# Patient Record
Sex: FEMALE | Race: WHITE | NOT HISPANIC OR LATINO | Employment: OTHER | URBAN - METROPOLITAN AREA
[De-identification: names, ages, dates, MRNs, and addresses within clinical notes are randomized per-mention and may not be internally consistent; named-entity substitution may affect disease eponyms.]

---

## 2021-09-20 ENCOUNTER — TELEPHONE (OUTPATIENT)
Dept: SCHEDULING | Facility: IMAGING CENTER | Age: 81
End: 2021-09-20

## 2021-09-21 ENCOUNTER — OFFICE VISIT (OUTPATIENT)
Dept: MEDICAL GROUP | Facility: PHYSICIAN GROUP | Age: 81
End: 2021-09-21
Payer: MEDICARE

## 2021-09-21 VITALS
TEMPERATURE: 97.7 F | BODY MASS INDEX: 23.83 KG/M2 | SYSTOLIC BLOOD PRESSURE: 112 MMHG | HEART RATE: 60 BPM | OXYGEN SATURATION: 97 % | HEIGHT: 63 IN | DIASTOLIC BLOOD PRESSURE: 70 MMHG | WEIGHT: 134.5 LBS | RESPIRATION RATE: 20 BRPM

## 2021-09-21 DIAGNOSIS — N95.1 MENOPAUSAL STATE: ICD-10-CM

## 2021-09-21 DIAGNOSIS — G50.0 TIC DOULOUREUX: ICD-10-CM

## 2021-09-21 DIAGNOSIS — Z78.0 POSTMENOPAUSAL STATUS (AGE-RELATED) (NATURAL): ICD-10-CM

## 2021-09-21 DIAGNOSIS — Z12.31 ENCOUNTER FOR SCREENING MAMMOGRAM FOR BREAST CANCER: ICD-10-CM

## 2021-09-21 DIAGNOSIS — Z00.00 WELLNESS EXAMINATION: ICD-10-CM

## 2021-09-21 DIAGNOSIS — E03.9 ACQUIRED HYPOTHYROIDISM: ICD-10-CM

## 2021-09-21 PROCEDURE — 99203 OFFICE O/P NEW LOW 30 MIN: CPT | Performed by: NURSE PRACTITIONER

## 2021-09-21 RX ORDER — LEVOTHYROXINE SODIUM 0.07 MG/1
75 TABLET ORAL
Qty: 30 TABLET | Refills: 0 | Status: SHIPPED | OUTPATIENT
Start: 2021-09-21 | End: 2021-10-27

## 2021-09-21 RX ORDER — LEVOTHYROXINE SODIUM 0.07 MG/1
75 TABLET ORAL
COMMUNITY
End: 2021-09-21 | Stop reason: SDUPTHER

## 2021-09-21 RX ORDER — GABAPENTIN 100 MG/1
100 CAPSULE ORAL 3 TIMES DAILY
COMMUNITY
End: 2022-05-25 | Stop reason: SDUPTHER

## 2021-09-21 ASSESSMENT — PATIENT HEALTH QUESTIONNAIRE - PHQ9: CLINICAL INTERPRETATION OF PHQ2 SCORE: 0

## 2021-09-21 NOTE — PROGRESS NOTES
Subjective  Chief Complaint  Establish care to manage her chronic conditions    History of Present Illness  Adriana Floyd is a 81 y.o. female. This patient is here today to establish care.  Her prior PCP was in [place].    Tic douloureux  Chronic and stable. Currently taking Gabapentin 100 mg three times a day. She states that most days she will only taking one a day and maybe two. She will get pain from her temple area down to her jaw. Current treatment is working well.    Acquired hypothyroidism  Currently taking levothyroxine 75 mcg daily as prescribed.   Denies symptoms including mood changes, anxiety/depression, chest pain/pressure, palpitations, fatigue, heat/cold intolerance, polydipsia, polyuria, diarrhea/constipation, abdominal pain, unexpected weight change, skin changes, hair thickening/coarsening/falling out/thinning, brittle nails, or edema.  Due for updated lab work.    Past Medical History    Allergies: Patient has no known allergies.  History reviewed. No pertinent past medical history.  Past Surgical History:   Procedure Laterality Date   • ABDOMINAL HYSTERECTOMY TOTAL     • SINUSCOPY       Current Outpatient Medications Ordered in Epic   Medication Sig Dispense Refill   • gabapentin (NEURONTIN) 100 MG Cap Take 100 mg by mouth 3 times a day.     • levothyroxine (SYNTHROID) 75 MCG Tab Take 1 Tablet by mouth every morning on an empty stomach. 30 Tablet 0     No current Epic-ordered facility-administered medications on file.     Family History:    Family History   Problem Relation Age of Onset   • Stroke Mother    • Diabetes Brother    • Lung Disease Neg Hx    • Cancer Neg Hx    • Hypertension Neg Hx       Personal/Social History:    Social History     Tobacco Use   • Smoking status: Current Every Day Smoker     Packs/day: 0.50     Types: Cigarettes   • Smokeless tobacco: Never Used   Vaping Use   • Vaping Use: Never used   Substance Use Topics   • Alcohol use: Not Currently   • Drug use: Never  "    Social History     Social History Narrative   • Not on file      Review of Systems:     General: Negative for fever/chills and unexpected weight change.    Eyes:  Negative for vision changes, eye pain.   ENT:  Negative for hearing changes, ear pain, congestion, sore throat, and neck pain.    Respiratory:  Negative for cough and dyspnea.     Cardiovascular:  Negative for chest pain and palpitations.   Gastrointestinal:  Negative for nausea/vomiting, changes in bowel habits, and abdominal pain.    Genitourinary:  Negative for dysuria and hematuria.    Musculoskeletal:  Negative for myalgias.    Skin:  Negative for rash.    Neurological:  Negative for numbness/tingling and headaches.    Heme/Lymph:  Does not bruise/bleed easily.     Objective  Physical Exam:   /70 (BP Location: Left arm, Patient Position: Sitting, BP Cuff Size: Adult)   Pulse 60   Temp 36.5 °C (97.7 °F) (Temporal)   Resp 20   Ht 1.6 m (5' 3\")   Wt 61 kg (134 lb 8 oz)   SpO2 97%  Body mass index is 23.83 kg/m².  General:  Alert and oriented.  Well appearing.  NAD.  Head:  Normocephalic.   Eyes:  Eyes conjunctiva clear lids without ptosis, pupils equal and reactive to light accommodation.    ENT: Ears normal shape and contour, canals are clear bilaterally, tympanic membranes are benign.  Oropharynx is without erythema, edema or exudates.   Neck: Supple without JVD. No lymphadenopathy.  Pulmonary:  Normal effort.  Clear to ausculation without rales, ronchi, or wheezing.  Cardiovascular:  Regular rate and rhythm without murmur, rubs or gallop.  Radial pulses are intact and equal bilaterally.  Musculoskeletal:  No extremity cyanosis, clubbing, or edema.  Skin:  Warm and dry.  No obvious lesions.  Psych: Normal mood and affect. Alert and oriented x3. Judgment and insight is normal.      Assessment/Plan   1. Acquired hypothyroidism  Chronic and ongoing.  Continue to take Levothyroxine 75 mcg every morning.  Due for updated labs.  - TSH WITH " REFLEX TO FT4; Future  - FREE THYROXINE; Future  - TRIIDOTHYRONINE; Future  - levothyroxine (SYNTHROID) 75 MCG Tab; Take 1 Tablet by mouth every morning on an empty stomach.  Dispense: 30 Tablet; Refill: 0    2. Tic douloureux  Chronic and stable.  Continue to take Gabapentin 100 mg up to three times a day.    3. Wellness examination  Due for updated labs.  - Lipid Profile; Future  - Comp Metabolic Panel; Future    4. Encounter for screening mammogram for breast cancer  - MA-SCREENING MAMMO BILAT W/TOMOSYNTHESIS W/CAD; Future    5. Postmenopausal status (age-related) (natural)  - DS-BONE DENSITY STUDY (DEXA)    6. Menopausal state  - DS-BONE DENSITY STUDY (DEXA)      Health Maintenance: Records Requested    Return if symptoms worsen or fail to improve.    Please note that this dictation was created using voice recognition software. I have made every reasonable attempt to correct obvious errors, but I expect that there are errors of grammar and possibly content that I did not discover before finalizing the note.    JENN Fermin  [facility]

## 2021-09-21 NOTE — ASSESSMENT & PLAN NOTE
Chronic and stable. Currently taking Gabapentin 100 mg three times a day. She states that most days she will only taking one a day and maybe two. She will get pain from her temple area down to her jaw. Current treatment is working well.

## 2021-09-21 NOTE — ASSESSMENT & PLAN NOTE
Currently taking levothyroxine 75 mcg daily as prescribed.   Denies symptoms including mood changes, anxiety/depression, chest pain/pressure, palpitations, fatigue, heat/cold intolerance, polydipsia, polyuria, diarrhea/constipation, abdominal pain, unexpected weight change, skin changes, hair thickening/coarsening/falling out/thinning, brittle nails, or edema.  Due for updated lab work.

## 2021-09-21 NOTE — LETTER
[facility]  FRANCISCA RuelasPSarahRLESLIE  [address]  [place] [address]  Fax: [phone]   Authorization for Release/Disclosure of   Protected Health Information   Name: PILI GRIGGS : 1940 SSN: [id number]   Address: [address] [place] [address]  [place] [address] Phone:    [phone] (home)    I authorize the entity listed below to release/disclose the PHI below to:   [facility]/VELMA Ruelas.P.R.RICHARD and HIWOT Ruelas   Provider or Entity Name:                                              Jin Lowe    Address                     [address] [place] [address], [place] [address]   Phone:  [phone]    Fax:     Reason for request: continuity of care   Information to be released:    [  ] LAST COLONOSCOPY,  including any PATH REPORT and follow-up  [  ] LAST FIT/COLOGUARD RESULT [  ] LAST DEXA  [  ] LAST MAMMOGRAM  [  ] LAST PAP  [  ] LAST LABS [  ] RETINA EXAM REPORT  [  ] IMMUNIZATION RECORDS  [XX] Release all info      [XX] Check here and initial the line next to each item to release ALL health information INCLUDING  _____ Care and treatment for drug and / or alcohol abuse  _____ HIV testing, infection status, or AIDS  _____ Genetic Testing    DATES OF SERVICE OR TIME PERIOD TO BE DISCLOSED: _____________  I understand and acknowledge that:  * This Authorization may be revoked at any time by you in writing, except if your health information has already been used or disclosed.  * Your health information that will be used or disclosed as a result of you signing this authorization could be re-disclosed by the recipient. If this occurs, your re-disclosed health information may no longer be protected by State or Federal laws.  * You may refuse to sign this Authorization. Your refusal will not affect your ability to obtain treatment.  * This Authorization becomes effective upon signing and will  on (date) __________.      If no date is indicated, this  Authorization will  one (1) year from the signature date.    Name: Adriana Floyd    Signature:   Date:     2021       PLEASE FAX REQUESTED RECORDS BACK TO: [phone]

## 2021-10-27 DIAGNOSIS — E03.9 ACQUIRED HYPOTHYROIDISM: ICD-10-CM

## 2021-10-27 RX ORDER — LEVOTHYROXINE SODIUM 0.07 MG/1
75 TABLET ORAL
Qty: 90 TABLET | Refills: 0 | Status: SHIPPED | OUTPATIENT
Start: 2021-10-27 | End: 2022-05-25 | Stop reason: SDUPTHER

## 2021-10-27 NOTE — TELEPHONE ENCOUNTER
Requested Prescriptions     Pending Prescriptions Disp Refills   • levothyroxine (SYNTHROID) 75 MCG Tab [Pharmacy Med Name: LEVOTHYROXINE 0.075MG (75MCG) TABS] 90 Tablet 0     Sig: TAKE 1 TABLET BY MOUTH EVERY MORNING ON AN EMPTY STOMACH

## 2022-05-19 ENCOUNTER — HOSPITAL ENCOUNTER (OUTPATIENT)
Dept: LAB | Facility: MEDICAL CENTER | Age: 82
End: 2022-05-19
Attending: NURSE PRACTITIONER
Payer: MEDICARE

## 2022-05-19 DIAGNOSIS — Z00.00 WELLNESS EXAMINATION: ICD-10-CM

## 2022-05-19 DIAGNOSIS — E03.9 ACQUIRED HYPOTHYROIDISM: ICD-10-CM

## 2022-05-19 LAB
ALBUMIN SERPL BCP-MCNC: 3.8 G/DL (ref 3.2–4.9)
ALBUMIN/GLOB SERPL: 1.4 G/DL
ALP SERPL-CCNC: 100 U/L (ref 30–99)
ALT SERPL-CCNC: 10 U/L (ref 2–50)
ANION GAP SERPL CALC-SCNC: 12 MMOL/L (ref 7–16)
AST SERPL-CCNC: 16 U/L (ref 12–45)
BILIRUB SERPL-MCNC: 0.5 MG/DL (ref 0.1–1.5)
BUN SERPL-MCNC: 14 MG/DL (ref 8–22)
CALCIUM SERPL-MCNC: 10.4 MG/DL (ref 8.5–10.5)
CHLORIDE SERPL-SCNC: 111 MMOL/L (ref 96–112)
CHOLEST SERPL-MCNC: 164 MG/DL (ref 100–199)
CO2 SERPL-SCNC: 22 MMOL/L (ref 20–33)
CREAT SERPL-MCNC: 1.06 MG/DL (ref 0.5–1.4)
FASTING STATUS PATIENT QL REPORTED: NORMAL
GFR SERPLBLD CREATININE-BSD FMLA CKD-EPI: 53 ML/MIN/1.73 M 2
GLOBULIN SER CALC-MCNC: 2.8 G/DL (ref 1.9–3.5)
GLUCOSE SERPL-MCNC: 89 MG/DL (ref 65–99)
HDLC SERPL-MCNC: 42 MG/DL
LDLC SERPL CALC-MCNC: 101 MG/DL
POTASSIUM SERPL-SCNC: 4.3 MMOL/L (ref 3.6–5.5)
PROT SERPL-MCNC: 6.6 G/DL (ref 6–8.2)
SODIUM SERPL-SCNC: 145 MMOL/L (ref 135–145)
T3 SERPL-MCNC: 104 NG/DL (ref 60–181)
T4 FREE SERPL-MCNC: 1.31 NG/DL (ref 0.93–1.7)
TRIGL SERPL-MCNC: 103 MG/DL (ref 0–149)
TSH SERPL DL<=0.005 MIU/L-ACNC: 1.88 UIU/ML (ref 0.38–5.33)

## 2022-05-19 PROCEDURE — 84480 ASSAY TRIIODOTHYRONINE (T3): CPT

## 2022-05-19 PROCEDURE — 80053 COMPREHEN METABOLIC PANEL: CPT

## 2022-05-19 PROCEDURE — 36415 COLL VENOUS BLD VENIPUNCTURE: CPT

## 2022-05-19 PROCEDURE — 84443 ASSAY THYROID STIM HORMONE: CPT

## 2022-05-19 PROCEDURE — 80061 LIPID PANEL: CPT

## 2022-05-19 PROCEDURE — 84439 ASSAY OF FREE THYROXINE: CPT

## 2022-05-25 ENCOUNTER — OFFICE VISIT (OUTPATIENT)
Dept: MEDICAL GROUP | Facility: PHYSICIAN GROUP | Age: 82
End: 2022-05-25
Payer: MEDICARE

## 2022-05-25 VITALS
RESPIRATION RATE: 20 BRPM | TEMPERATURE: 98 F | OXYGEN SATURATION: 98 % | HEART RATE: 60 BPM | SYSTOLIC BLOOD PRESSURE: 110 MMHG | BODY MASS INDEX: 23.79 KG/M2 | WEIGHT: 134.25 LBS | DIASTOLIC BLOOD PRESSURE: 76 MMHG | HEIGHT: 63 IN

## 2022-05-25 DIAGNOSIS — E03.9 ACQUIRED HYPOTHYROIDISM: ICD-10-CM

## 2022-05-25 DIAGNOSIS — G50.0 TIC DOULOUREUX: ICD-10-CM

## 2022-05-25 PROCEDURE — 99214 OFFICE O/P EST MOD 30 MIN: CPT | Performed by: NURSE PRACTITIONER

## 2022-05-25 RX ORDER — GABAPENTIN 100 MG/1
100 CAPSULE ORAL 3 TIMES DAILY
Qty: 270 CAPSULE | Refills: 3 | Status: SHIPPED | OUTPATIENT
Start: 2022-05-25 | End: 2023-07-12 | Stop reason: SDUPTHER

## 2022-05-25 RX ORDER — LEVOTHYROXINE SODIUM 0.07 MG/1
75 TABLET ORAL
Qty: 90 TABLET | Refills: 3 | Status: SHIPPED | OUTPATIENT
Start: 2022-05-25 | End: 2023-07-12 | Stop reason: SDUPTHER

## 2022-05-25 ASSESSMENT — PATIENT HEALTH QUESTIONNAIRE - PHQ9: CLINICAL INTERPRETATION OF PHQ2 SCORE: 0

## 2022-05-25 NOTE — ASSESSMENT & PLAN NOTE
Currently taking levothyroxine 75 mcg daily as prescribed.   Denies symptoms including mood changes, anxiety/depression, chest pain/pressure, palpitations, fatigue, heat/cold intolerance, polydipsia, polyuria, diarrhea/constipation, abdominal pain, unexpected weight change, skin changes, hair thickening/coarsening/falling out/thinning, brittle nails, or edema.  Due for updated lab work May 2023.     05/19/22 12:01   TSH 1.880 [1]   Free T-4 1.31   T3 104.0

## 2022-05-25 NOTE — PROGRESS NOTES
Subjective  Chief Complaint  Lab Results    History of Present Illness  Adriana Floyd is a 82 y.o. female. This established patient is here today to go over recent labs.    Acquired hypothyroidism  Currently taking levothyroxine 75 mcg daily as prescribed.   Denies symptoms including mood changes, anxiety/depression, chest pain/pressure, palpitations, fatigue, heat/cold intolerance, polydipsia, polyuria, diarrhea/constipation, abdominal pain, unexpected weight change, skin changes, hair thickening/coarsening/falling out/thinning, brittle nails, or edema.  Due for updated lab work May 2023.     05/19/22 12:01   TSH 1.880 [1]   Free T-4 1.31   T3 104.0       Tic douloureux  Chronic and stable. Currently taking Gabapentin 100 mg three times a day. She states that most days she will only taking one a day and maybe two. She will get pain from her temple area down to her jaw. Current treatment is working well.    Past Medical History    Allergies: Patient has no known allergies.  History reviewed. No pertinent past medical history.  Past Surgical History:   Procedure Laterality Date   • ABDOMINAL HYSTERECTOMY TOTAL     • SINUSCOPY       Current Outpatient Medications Ordered in Epic   Medication Sig Dispense Refill   • levothyroxine (SYNTHROID) 75 MCG Tab Take 1 Tablet by mouth every morning on an empty stomach. 90 Tablet 3   • gabapentin (NEURONTIN) 100 MG Cap Take 1 Capsule by mouth 3 times a day. 270 Capsule 3     No current [facility]-ordered facility-administered medications on file.     Family History:    Family History   Problem Relation Age of Onset   • Stroke Mother    • Diabetes Brother    • Lung Disease Neg Hx    • Cancer Neg Hx    • Hypertension Neg Hx       Personal/Social History:    Social History     Tobacco Use   • Smoking status: Current Every Day Smoker     Packs/day: 0.50     Types: Cigarettes   • Smokeless tobacco: Never Used   Vaping Use   • Vaping Use: Never used   Substance Use Topics   • Alcohol  "use: Not Currently   • Drug use: Never     Social History     Social History Narrative   • Not on file      Review of Systems:   General: Negative for fever/chills and unexpected weight change.   Eyes:  Negative for vision changes, eye pain.  Respiratory:  Negative for cough and dyspnea.    Cardiovascular:  Negative for chest pain and palpitations.  Musculoskeletal:  Negative for myalgias.   Skin:  Negative for rash.   Neurological:  Negative for numbness/tingling and headaches.     Objective  Physical Exam:   /76 (BP Location: Left arm, Patient Position: Sitting, BP Cuff Size: Adult)   Pulse 60   Temp 36.7 °C (98 °F) (Temporal)   Resp 20   Ht 1.6 m (5' 3\")   Wt 60.9 kg (134 lb 4 oz)   SpO2 98%  Body mass index is 23.78 kg/m².  General:  Alert and oriented.  Well appearing.  NAD  Neck: Supple without JVD. No lymphadenopathy.  Pulmonary:  Normal effort.  Clear to ausculation without rales, ronchi, or wheezing.  Cardiovascular:  Regular rate and rhythm without murmur, rubs or gallop.   Skin:  Warm and dry.  No obvious lesions.  Musculoskeletal:  No extremity cyanosis, clubbing, or edema.      Diagnostic Testing/Findings:  Labs:    05/19/22 12:01   Sodium 145   Potassium 4.3   Chloride 111   Co2 22   Anion Gap 12.0   Glucose 89   Bun 14   Creatinine 1.06   GFR (CKD-EPI) 53 ! [1]   Calcium 10.4   AST(SGOT) 16   ALT(SGPT) 10   Alkaline Phosphatase 100 (H)   Total Bilirubin 0.5   Albumin 3.8   Total Protein 6.6   Globulin 2.8   A-G Ratio 1.4   Fasting Status Fasting   Cholesterol,Tot 164   Triglycerides 103   HDL 42    (H)   TSH 1.880 [2]   Free T-4 1.31   T3 104.0       Assessment/Plan  1. Acquired hypothyroidism  Chronic and ongoing.  Continue to take Levothyroxine 75 mcg every morning on an empty stomach.  - levothyroxine (SYNTHROID) 75 MCG Tab; Take 1 Tablet by mouth every morning on an empty stomach.  Dispense: 90 Tablet; Refill: 3    2. Tic douloureux  Chronic and ongoing.  Continue to take " Gabapentin 100 mg three times a day.  - gabapentin (NEURONTIN) 100 MG Cap; Take 1 Capsule by mouth 3 times a day.  Dispense: 270 Capsule; Refill: 3      Health Maintenance: Discussed with the patient.    Return if symptoms worsen or fail to improve.    Please note that this dictation was created using voice recognition software. I have made every reasonable attempt to correct obvious errors, but I expect that there are errors of grammar and possibly content that I did not discover before finalizing the note.    JENN Fermin  [facility]

## 2022-08-16 ENCOUNTER — OFFICE VISIT (OUTPATIENT)
Dept: URGENT CARE | Facility: PHYSICIAN GROUP | Age: 82
End: 2022-08-16
Payer: MEDICARE

## 2022-08-16 VITALS
RESPIRATION RATE: 16 BRPM | HEART RATE: 66 BPM | TEMPERATURE: 98.1 F | BODY MASS INDEX: 24.27 KG/M2 | OXYGEN SATURATION: 95 % | SYSTOLIC BLOOD PRESSURE: 118 MMHG | HEIGHT: 63 IN | DIASTOLIC BLOOD PRESSURE: 76 MMHG | WEIGHT: 137 LBS

## 2022-08-16 DIAGNOSIS — J22 LOWER RESPIRATORY INFECTION: ICD-10-CM

## 2022-08-16 PROCEDURE — 99213 OFFICE O/P EST LOW 20 MIN: CPT | Performed by: FAMILY MEDICINE

## 2022-08-16 RX ORDER — DOXYCYCLINE HYCLATE 100 MG
100 TABLET ORAL 2 TIMES DAILY
Qty: 14 TABLET | Refills: 0 | Status: SHIPPED | OUTPATIENT
Start: 2022-08-16 | End: 2022-08-23

## 2022-08-16 ASSESSMENT — ENCOUNTER SYMPTOMS
SHORTNESS OF BREATH: 1
FEVER: 0
COUGH: 1

## 2022-08-16 NOTE — PROGRESS NOTES
"Subjective:     Adriana Floyd is a 82 y.o. female who presents for Shortness of Breath    HPI  Pt presents for evaluation of an acute problem  Pt with an acute illness the past week   Having productive cough   Feels out of breath at times   Having some nasal congestion   No fevers   No diarrhea   Appetite is ok     Review of Systems   Constitutional:  Negative for fever.   Respiratory:  Positive for cough and shortness of breath.      PMH:  has no past medical history on file.  MEDS:   Current Outpatient Medications:     levothyroxine (SYNTHROID) 75 MCG Tab, Take 1 Tablet by mouth every morning on an empty stomach., Disp: 90 Tablet, Rfl: 3    gabapentin (NEURONTIN) 100 MG Cap, Take 1 Capsule by mouth 3 times a day., Disp: 270 Capsule, Rfl: 3  ALLERGIES: No Known Allergies  SURGHX:   Past Surgical History:   Procedure Laterality Date    ABDOMINAL HYSTERECTOMY TOTAL      SINUSCOPY       SOCHX:  reports that she has been smoking cigarettes. She has been smoking an average of .5 packs per day. She has never used smokeless tobacco. She reports that she does not currently use alcohol. She reports that she does not use drugs.     Objective:   /76   Pulse 66   Temp 36.7 °C (98.1 °F) (Temporal)   Resp 16   Ht 1.6 m (5' 3\")   Wt 62.1 kg (137 lb)   SpO2 95%   BMI 24.27 kg/m²     Physical Exam  Constitutional:       General: She is not in acute distress.     Appearance: She is well-developed. She is not diaphoretic.   HENT:      Head: Normocephalic and atraumatic.      Right Ear: Tympanic membrane, ear canal and external ear normal.      Left Ear: Tympanic membrane, ear canal and external ear normal.      Nose: Congestion present.      Mouth/Throat:      Mouth: Mucous membranes are moist.      Pharynx: Oropharynx is clear. No oropharyngeal exudate or posterior oropharyngeal erythema.   Neck:      Trachea: No tracheal deviation.   Cardiovascular:      Rate and Rhythm: Normal rate and regular rhythm. "   Pulmonary:      Effort: Pulmonary effort is normal. No respiratory distress.      Breath sounds: Normal breath sounds. No wheezing or rales.   Musculoskeletal:      Cervical back: Normal range of motion and neck supple. No tenderness.   Lymphadenopathy:      Cervical: No cervical adenopathy.   Skin:     General: Skin is warm and dry.      Findings: No rash.   Neurological:      Mental Status: She is alert.       Assessment/Plan:   Assessment    1. Lower respiratory infection  - doxycycline (VIBRAMYCIN) 100 MG Tab; Take 1 Tablet by mouth 2 times a day for 7 days.  Dispense: 14 Tablet; Refill: 0    Patient with lower respiratory infection.  She is making some improvements and advised to start with supportive care measures only.  Suspect she will make a great recovery.  No signs of pneumonia at this time.  However, due to her length of illness and age, did supply prescription for antibiotics to be used only on a continued basis.  Reviewed criteria for filling prescription and patient agreeable to plan.  Follow-up in the urgent care as needed.

## 2022-10-27 ENCOUNTER — TELEPHONE (OUTPATIENT)
Dept: MEDICAL GROUP | Facility: PHYSICIAN GROUP | Age: 82
End: 2022-10-27
Payer: MEDICARE

## 2022-10-27 NOTE — TELEPHONE ENCOUNTER
Called the daughter Elly back and left a voicemail as she did not answer. Let her know that the CT scan and ER documentation did not show any bleeding or concern for a brain bleed. So at this time Adriana is okay to fly back home to [place]. Left her a message to call back with any questions or concerns.

## 2022-10-27 NOTE — TELEPHONE ENCOUNTER
Caller Name: Elly Powell Daughter   Call Back Number: [phone]    How would the patient prefer to be contacted with a response: Phone call do NOT leave a detailed message    Patient had a fall causing a head injury on 10/22 and she is flying back to [place] from [place] on 10/28 and now is requesting a phone call to get advise to see if it ok or if there is any concern for Adriana to fly back.     Can you please advise.    Thank you,  Trisha Hutton  Medical assistant

## 2023-02-14 ENCOUNTER — OFFICE VISIT (OUTPATIENT)
Dept: MEDICAL GROUP | Facility: PHYSICIAN GROUP | Age: 83
End: 2023-02-14
Payer: MEDICARE

## 2023-02-14 VITALS
TEMPERATURE: 97 F | DIASTOLIC BLOOD PRESSURE: 70 MMHG | SYSTOLIC BLOOD PRESSURE: 102 MMHG | RESPIRATION RATE: 20 BRPM | WEIGHT: 131.38 LBS | HEIGHT: 63 IN | BODY MASS INDEX: 23.28 KG/M2 | HEART RATE: 66 BPM | OXYGEN SATURATION: 97 %

## 2023-02-14 DIAGNOSIS — E78.00 ELEVATED LDL CHOLESTEROL LEVEL: ICD-10-CM

## 2023-02-14 DIAGNOSIS — Z13.1 ENCOUNTER FOR SCREENING FOR DIABETES MELLITUS: ICD-10-CM

## 2023-02-14 DIAGNOSIS — E03.9 ACQUIRED HYPOTHYROIDISM: ICD-10-CM

## 2023-02-14 DIAGNOSIS — M54.2 NECK PAIN: ICD-10-CM

## 2023-02-14 PROCEDURE — 99214 OFFICE O/P EST MOD 30 MIN: CPT | Performed by: NURSE PRACTITIONER

## 2023-02-14 ASSESSMENT — PATIENT HEALTH QUESTIONNAIRE - PHQ9: CLINICAL INTERPRETATION OF PHQ2 SCORE: 0

## 2023-02-14 NOTE — PROGRESS NOTES
Subjective  Chief Complaint  Neck Pain    History of Present Illness  Adriana Floyd is a 82 y.o. female. This established patient is here today to discuss her neck pain.    Neck pain  Chronic and ongoing. She fell on the front of her right side of her head in October. She states that ever since then the back of her neck on the right side has started to hurt. She states that the pain will come and go, aching. Denies any numbness, tingling, dizziness or nausea. She has tried taking OTC Ibuprofen which has helped some. She is requesting an x-ray at this time.    Past Medical History    Allergies: Oxycodone-acetaminophen  History reviewed. No pertinent past medical history.  Past Surgical History:   Procedure Laterality Date    ABDOMINAL HYSTERECTOMY TOTAL      SINUSCOPY       Current Outpatient Medications Ordered in Epic   Medication Sig Dispense Refill    diclofenac sodium (VOLTAREN) 1 % Gel Apply 2 g topically 4 times a day as needed (Pain). 50 g 1    levothyroxine (SYNTHROID) 75 MCG Tab Take 1 Tablet by mouth every morning on an empty stomach. 90 Tablet 3    gabapentin (NEURONTIN) 100 MG Cap Take 1 Capsule by mouth 3 times a day. 270 Capsule 3     No current [facility]-ordered facility-administered medications on file.     Family History:    Family History   Problem Relation Age of Onset    Stroke Mother     Diabetes Brother     Lung Disease Neg Hx     Cancer Neg Hx     Hypertension Neg Hx       Personal/Social History:    Social History     Tobacco Use    Smoking status: Every Day     Packs/day: 0.50     Types: Cigarettes    Smokeless tobacco: Never   Vaping Use    Vaping Use: Never used   Substance Use Topics    Alcohol use: Not Currently    Drug use: Never     Social History     Social History Narrative    Not on file      Review of Systems:   General: Negative for fever/chills and unexpected weight change.   Respiratory:  Negative for cough and dyspnea.    Cardiovascular:  Negative for chest pain and  "palpitations.  Musculoskeletal: Positive for neck pain.  Skin:  Negative for rash.   Neurological:  Negative for numbness/tingling and headaches.     Objective  Physical Exam:   /70 (BP Location: Left arm, Patient Position: Sitting, BP Cuff Size: Adult)   Pulse 66   Temp 36.1 °C (97 °F) (Temporal)   Resp 20   Ht 1.6 m (5' 3\")   Wt 59.6 kg (131 lb 6 oz)   SpO2 97%  Body mass index is 23.27 kg/m².  General:  Alert and oriented.  Well appearing.  NAD  Neck: Supple without JVD. No lymphadenopathy.  Pulmonary:  Normal effort.  Clear to ausculation without rales, ronchi, or wheezing.  Cardiovascular:  Regular rate and rhythm without murmur, rubs or gallop.   Skin:  Warm and dry.  No obvious lesions.  Musculoskeletal: Posterior right lower neck tender to palpation, full ROM.      Assessment/Plan  1. Neck pain  Chronic and ongoing.  Discussed getting an x-ray of her neck, she is agreeable.  Discussed to continue to use OTC Ibuprofen for her pain.  Discussed Voltaren gel risks, benefits and side effects, she verbalized understanding.  Discussed in length a referral to Sports Medicine, she is agreeable and would like a referral placed at this time.  - DX-CERVICAL SPINE-2 OR 3 VIEWS; Future  - Referral to Sports Medicine  - diclofenac sodium (VOLTAREN) 1 % Gel; Apply 2 g topically 4 times a day as needed (Pain).  Dispense: 50 g; Refill: 1    2. Acquired hypothyroidism  Chronic and ongoing.  Continue to take Levothyroxine 75 mcg every morning on an empty stomach.  Due for updated labs.  - TSH; Future  - FREE THYROXINE; Future  - TRIIDOTHYRONINE; Future    3. Elevated LDL cholesterol level  Chronic and ongoing.  Educated on a healthy diet and exercise.  Due for updated labs.  - Lipid Profile; Future    4. Encounter for screening for diabetes mellitus  Due for updated labs.  - Comp Metabolic Panel; Future      Health Maintenance: Completed    Return if symptoms worsen or fail to improve.    Discussed that the patient " carries some responsibility in management of their health care.    Please note that this dictation was created using voice recognition software. I have made every reasonable attempt to correct obvious errors, but I expect that there are errors of grammar and possibly content that I did not discover before finalizing the note.    JENN Fermin  [facility]

## 2023-02-14 NOTE — ASSESSMENT & PLAN NOTE
Chronic and ongoing. She fell on the front of her right side of her head in October. She states that ever since then the back of her neck on the right side has started to hurt. She states that the pain will come and go, aching. Denies any numbness, tingling, dizziness or nausea. She has tried taking OTC Ibuprofen which has helped some. She is requesting an x-ray at this time.

## 2023-04-07 ENCOUNTER — TELEPHONE (OUTPATIENT)
Dept: HEALTH INFORMATION MANAGEMENT | Facility: OTHER | Age: 83
End: 2023-04-07
Payer: MEDICARE

## 2023-06-20 ENCOUNTER — HOSPITAL ENCOUNTER (OUTPATIENT)
Dept: LAB | Facility: MEDICAL CENTER | Age: 83
End: 2023-06-20
Attending: NURSE PRACTITIONER
Payer: MEDICARE

## 2023-06-20 DIAGNOSIS — Z13.1 ENCOUNTER FOR SCREENING FOR DIABETES MELLITUS: ICD-10-CM

## 2023-06-20 DIAGNOSIS — E03.9 ACQUIRED HYPOTHYROIDISM: ICD-10-CM

## 2023-06-20 DIAGNOSIS — E78.00 ELEVATED LDL CHOLESTEROL LEVEL: ICD-10-CM

## 2023-06-20 PROCEDURE — 84439 ASSAY OF FREE THYROXINE: CPT

## 2023-06-20 PROCEDURE — 80053 COMPREHEN METABOLIC PANEL: CPT

## 2023-06-20 PROCEDURE — 80061 LIPID PANEL: CPT

## 2023-06-20 PROCEDURE — 36415 COLL VENOUS BLD VENIPUNCTURE: CPT

## 2023-06-20 PROCEDURE — 84480 ASSAY TRIIODOTHYRONINE (T3): CPT

## 2023-06-20 PROCEDURE — 84443 ASSAY THYROID STIM HORMONE: CPT

## 2023-06-21 LAB
ALBUMIN SERPL BCP-MCNC: 3.9 G/DL (ref 3.2–4.9)
ALBUMIN/GLOB SERPL: 1.4 G/DL
ALP SERPL-CCNC: 82 U/L (ref 30–99)
ALT SERPL-CCNC: 9 U/L (ref 2–50)
ANION GAP SERPL CALC-SCNC: 14 MMOL/L (ref 7–16)
AST SERPL-CCNC: 10 U/L (ref 12–45)
BILIRUB SERPL-MCNC: 0.9 MG/DL (ref 0.1–1.5)
BUN SERPL-MCNC: 14 MG/DL (ref 8–22)
CALCIUM ALBUM COR SERPL-MCNC: 9.9 MG/DL (ref 8.5–10.5)
CALCIUM SERPL-MCNC: 9.8 MG/DL (ref 8.5–10.5)
CHLORIDE SERPL-SCNC: 109 MMOL/L (ref 96–112)
CHOLEST SERPL-MCNC: 190 MG/DL (ref 100–199)
CO2 SERPL-SCNC: 19 MMOL/L (ref 20–33)
CREAT SERPL-MCNC: 1.18 MG/DL (ref 0.5–1.4)
FASTING STATUS PATIENT QL REPORTED: NORMAL
GFR SERPLBLD CREATININE-BSD FMLA CKD-EPI: 46 ML/MIN/1.73 M 2
GLOBULIN SER CALC-MCNC: 2.8 G/DL (ref 1.9–3.5)
GLUCOSE SERPL-MCNC: 86 MG/DL (ref 65–99)
HDLC SERPL-MCNC: 38 MG/DL
LDLC SERPL CALC-MCNC: 126 MG/DL
POTASSIUM SERPL-SCNC: 4.1 MMOL/L (ref 3.6–5.5)
PROT SERPL-MCNC: 6.7 G/DL (ref 6–8.2)
SODIUM SERPL-SCNC: 142 MMOL/L (ref 135–145)
T3 SERPL-MCNC: 109 NG/DL (ref 60–181)
T4 FREE SERPL-MCNC: 1.82 NG/DL (ref 0.93–1.7)
TRIGL SERPL-MCNC: 132 MG/DL (ref 0–149)
TSH SERPL DL<=0.005 MIU/L-ACNC: 0.42 UIU/ML (ref 0.38–5.33)

## 2023-06-23 ENCOUNTER — TELEPHONE (OUTPATIENT)
Dept: MEDICAL GROUP | Facility: PHYSICIAN GROUP | Age: 83
End: 2023-06-23
Payer: MEDICARE

## 2023-06-24 NOTE — TELEPHONE ENCOUNTER
----- Message from August Asher III, M.D. sent at 6/21/2023  7:57 AM PDT -----  Please tell patient that Quyen is no longer at [facility].  Her lab work shows a couple of minor abnormalities.  Her GFR which is a kidney test is a little worse.  Her cholesterol and LDL are also higher than last year.  She needs to establish with a new PCP to go over these results.  Dr. Asher

## 2023-06-24 NOTE — TELEPHONE ENCOUNTER
Phone Number Called:  [phone]      Call outcome: Called the pt.  Pt was notified of results. Pt verbalized understanding. Stated no further questions. She stated she was going to go over with the cardiologist but she was going to set up an appointment with a PCP shortly

## 2023-06-25 NOTE — PROGRESS NOTES
Cardiology Initial Consultation Note    Date of note: 6/25/2023    Primary Care Provider: HIWOT Ruelas  Referring Provider: No ref. provider found    Patient Name: Adriana Floyd  YOB: 1940  MRN:              [id number]    Chief Complaint   Patient presents with    Palpitations     History of Present Illness: Ms. Adriana Floyd is a 83 y.o. female whose current medical problems include hypothyroidism, basal cell carcinoma, and x-ray of the spine that reported abdominal aorta is top normal in [place] who is here for cardiac consultation for potation's and dyspnea on exertion along with weakness.  Patient is accompanied by a family member today.    She thinks she has all the symptoms of a. Fib.  Usually in the morning, she feels like her heart is fluttering each episode lasting about a minute or so, happens about 4 times a week.  It does not happen any other times in the morning.  No associated symptoms.    She also has been having dyspnea on exertion in last 6 months, more so in last 3 months.  She also has associated lightheaded.  She feels weak like she needs to hold onto something.  And at the grocery store, she has to have a cart to hold onto as she gets very tired out and out of breath according to her and her friend.    She denies any chest pain or discomfort, no lower extreme edema, no orthopnea, no PND.    Last October, she fell off her deck and hit her head.  She has been having more pain in her next.   She has more headaches.      Cardiovascular Risk Factors:  1. Smoking status:   Tobacco Use: High Risk (6/27/2023)    Patient History     Smoking Tobacco Use: Every Day     Smokeless Tobacco Use: Never     Passive Exposure: Not on file     2. Type II Diabetes Mellitus: No results found for: HBA1C  3. Hypertension: No  4. Dyslipidemia: Not on statins  Cholesterol,Tot   Date Value Ref Range Status   06/20/2023 190 100 - 199 mg/dL Final     LDL   Date Value Ref Range Status  "  06/20/2023 126 (H) <100 mg/dL Final     HDL   Date Value Ref Range Status   06/20/2023 38 (A) >=40 mg/dL Final     Triglycerides   Date Value Ref Range Status   06/20/2023 132 0 - 149 mg/dL Final     5. Family history of early Coronary Artery Disease in a first degree relative (Male less than 55 years of age; Female less than 65 years of age): No    History reviewed. No pertinent past medical history.    Past Surgical History:   Procedure Laterality Date    ABDOMINAL HYSTERECTOMY TOTAL      SINUSCOPY         Current Outpatient Medications   Medication Sig Dispense Refill    diclofenac sodium (VOLTAREN) 1 % Gel Apply 2 g topically 4 times a day as needed (Pain). 50 g 1    levothyroxine (SYNTHROID) 75 MCG Tab Take 1 Tablet by mouth every morning on an empty stomach. 90 Tablet 3    gabapentin (NEURONTIN) 100 MG Cap Take 1 Capsule by mouth 3 times a day. 270 Capsule 3     No current facility-administered medications for this visit.       Allergies   Allergen Reactions    Oxycodone-Acetaminophen Vomiting       Family History   Problem Relation Age of Onset    Stroke Mother     Diabetes Brother     Lung Disease Neg Hx     Cancer Neg Hx     Hypertension Neg Hx      Social History     Socioeconomic History    Marital status:    Tobacco Use    Smoking status: Every Day     Packs/day: 0.50     Types: Cigarettes    Smokeless tobacco: Never   Vaping Use    Vaping Use: Never used   Substance and Sexual Activity    Alcohol use: Not Currently    Drug use: Never    Sexual activity: Not Currently      Review of Systems   Constitutional: Negative for diaphoresis and fever.   Respiratory:  Negative for cough, hemoptysis and wheezing.    Gastrointestinal:  Negative for hematochezia and melena.   Genitourinary:  Negative for hematuria.       Physical Exam:  Ambulatory Vitals  /78 (BP Location: Left arm, Patient Position: Sitting, BP Cuff Size: Adult)   Pulse (!) 57   Resp 16   Ht 1.6 m (5' 3\")   Wt 60.3 kg (133 lb)   " SpO2 95%    Oxygen Therapy:  Pulse Oximetry: 95 %  BP Readings from Last 4 Encounters:   06/27/23 122/78   02/14/23 102/70   08/16/22 118/76   05/25/22 110/76     Weight/BMI:   Body mass index is 23.56 kg/m².  Wt Readings from Last 2 Encounters:   06/27/23 60.3 kg (133 lb)   02/14/23 59.6 kg (131 lb 6 oz)     Physical Exam  Constitutional:       Appearance: Normal appearance.   Eyes:      Extraocular Movements: Extraocular movements intact.      Conjunctiva/sclera: Conjunctivae normal.   Neck:      Vascular: No carotid bruit or JVD.   Cardiovascular:      Rate and Rhythm: Normal rate and regular rhythm.      Pulses:           Radial pulses are 2+ on the right side and 2+ on the left side.        Posterior tibial pulses are 1+ on the right side and 1+ on the left side.      Heart sounds: Normal heart sounds. No murmur heard.     No friction rub. No gallop.   Pulmonary:      Effort: Pulmonary effort is normal. No respiratory distress.      Breath sounds: Normal breath sounds. No wheezing.   Abdominal:      General: Bowel sounds are normal.      Palpations: Abdomen is soft.   Musculoskeletal:      Cervical back: Neck supple.      Right lower leg: No edema.      Left lower leg: No edema.   Skin:     General: Skin is warm and dry.   Neurological:      Mental Status: She is alert and oriented to person, place, and time. Mental status is at baseline.   Psychiatric:         Mood and Affect: Mood normal.          Lab Data Review:  Lab Results   Component Value Date/Time    SODIUM 142 06/20/2023 09:52 AM    POTASSIUM 4.1 06/20/2023 09:52 AM    CHLORIDE 109 06/20/2023 09:52 AM    CO2 19 (L) 06/20/2023 09:52 AM    GLUCOSE 86 06/20/2023 09:52 AM    BUN 14 06/20/2023 09:52 AM    CREATININE 1.18 06/20/2023 09:52 AM     Lab Results   Component Value Date/Time    ALKPHOSPHAT 82 06/20/2023 09:52 AM    ASTSGOT 10 (L) 06/20/2023 09:52 AM    ALTSGPT 9 06/20/2023 09:52 AM    TBILIRUBIN 0.9 06/20/2023 09:52 AM      No results found for:  WBC  Lab Results   Component Value Date/Time    CARYL 0.420 06/20/2023 09:52 AM      Cardiac Imaging and Procedures Review:    EKG dated 6/27/23: My personal interpretation is sinus rhythm, 56 bpm, early transition, borderline nonspecific ST-T changes, low voltages in the precordial leads, no prior ECG for comparison    Assessment & Plan     1.  Palpitations lasting a minute in the morning.  Likely due to premature beats.  Does not quite sound sustained.  She gets it 4 times a week, should be able to capture something on the event monitor.  - Event monitor (Zio patch)    2.  Dyspnea on exertion and weakness.  This can be multifactorial.  Differential diagnoses would include spinal stenosis, chronotropic incompetence, highly unlikely to be obstructive coronary disease.  She does not demonstrate any signs of heart failure, and her cardiac exam is normal.  Therefore I do not think an echo is indicated.  We will go ahead and check a BNP, and if it is normal, no echo is needed, but if significantly abnormal ie >400, it would warrant a echocardiogram.  - Event monitor so we can see if her heart rate responds appropriately with exertion  - Get BNP    Shared Medical Decision Making:  All of patient's questions were answered to the best of my knowledge and to patient's satisfaction. It was a pleasure seeing Ms. Adriana Floyd in my clinic today. Return in about 5 weeks (around 8/1/2023). Patient is aware to call the cardiology clinic with any questions or concerns.    Arcelia Hooper MD  [facility]  [address]  [place] [address]  Phone:  [phone]  Fax:  [phone]    Please note that this dictation was created using voice recognition software. I have made every reasonable attempt to correct obvious errors, but it is possible there are errors of grammar and possibly content that I did not discover before finalizing the note.

## 2023-06-27 ENCOUNTER — OFFICE VISIT (OUTPATIENT)
Dept: CARDIOLOGY | Facility: MEDICAL CENTER | Age: 83
End: 2023-06-27
Attending: INTERNAL MEDICINE
Payer: MEDICARE

## 2023-06-27 VITALS
OXYGEN SATURATION: 95 % | HEIGHT: 63 IN | DIASTOLIC BLOOD PRESSURE: 78 MMHG | HEART RATE: 57 BPM | SYSTOLIC BLOOD PRESSURE: 122 MMHG | BODY MASS INDEX: 23.57 KG/M2 | RESPIRATION RATE: 16 BRPM | WEIGHT: 133 LBS

## 2023-06-27 DIAGNOSIS — R00.2 PALPITATIONS: ICD-10-CM

## 2023-06-27 DIAGNOSIS — R06.00 DYSPNEA, UNSPECIFIED TYPE: ICD-10-CM

## 2023-06-27 LAB — EKG IMPRESSION: NORMAL

## 2023-06-27 PROCEDURE — 99204 OFFICE O/P NEW MOD 45 MIN: CPT | Mod: 25 | Performed by: INTERNAL MEDICINE

## 2023-06-27 PROCEDURE — 3074F SYST BP LT 130 MM HG: CPT | Performed by: INTERNAL MEDICINE

## 2023-06-27 PROCEDURE — 93010 ELECTROCARDIOGRAM REPORT: CPT | Performed by: INTERNAL MEDICINE

## 2023-06-27 PROCEDURE — 3078F DIAST BP <80 MM HG: CPT | Performed by: INTERNAL MEDICINE

## 2023-06-27 PROCEDURE — 93005 ELECTROCARDIOGRAM TRACING: CPT | Performed by: INTERNAL MEDICINE

## 2023-06-27 PROCEDURE — 99211 OFF/OP EST MAY X REQ PHY/QHP: CPT | Performed by: INTERNAL MEDICINE

## 2023-06-27 ASSESSMENT — ENCOUNTER SYMPTOMS
WHEEZING: 0
FEVER: 0
COUGH: 0
HEMATOCHEZIA: 0
HEMOPTYSIS: 0
DIAPHORESIS: 0

## 2023-06-28 ENCOUNTER — HOSPITAL ENCOUNTER (OUTPATIENT)
Dept: LAB | Facility: MEDICAL CENTER | Age: 83
End: 2023-06-28
Attending: INTERNAL MEDICINE
Payer: MEDICARE

## 2023-06-28 DIAGNOSIS — R06.00 DYSPNEA, UNSPECIFIED TYPE: ICD-10-CM

## 2023-06-28 LAB — NT-PROBNP SERPL IA-MCNC: 664 PG/ML (ref 0–125)

## 2023-06-28 PROCEDURE — 83880 ASSAY OF NATRIURETIC PEPTIDE: CPT

## 2023-06-28 PROCEDURE — 36415 COLL VENOUS BLD VENIPUNCTURE: CPT

## 2023-06-29 DIAGNOSIS — R79.89 ELEVATED BRAIN NATRIURETIC PEPTIDE (BNP) LEVEL: ICD-10-CM

## 2023-07-11 SDOH — ECONOMIC STABILITY: TRANSPORTATION INSECURITY
IN THE PAST 12 MONTHS, HAS THE LACK OF TRANSPORTATION KEPT YOU FROM MEDICAL APPOINTMENTS OR FROM GETTING MEDICATIONS?: NO

## 2023-07-11 SDOH — ECONOMIC STABILITY: HOUSING INSECURITY
IN THE LAST 12 MONTHS, WAS THERE A TIME WHEN YOU DID NOT HAVE A STEADY PLACE TO SLEEP OR SLEPT IN A SHELTER (INCLUDING NOW)?: NO

## 2023-07-11 SDOH — HEALTH STABILITY: PHYSICAL HEALTH: ON AVERAGE, HOW MANY DAYS PER WEEK DO YOU ENGAGE IN MODERATE TO STRENUOUS EXERCISE (LIKE A BRISK WALK)?: 0 DAYS

## 2023-07-11 SDOH — ECONOMIC STABILITY: FOOD INSECURITY: WITHIN THE PAST 12 MONTHS, YOU WORRIED THAT YOUR FOOD WOULD RUN OUT BEFORE YOU GOT MONEY TO BUY MORE.: NEVER TRUE

## 2023-07-11 SDOH — ECONOMIC STABILITY: INCOME INSECURITY: HOW HARD IS IT FOR YOU TO PAY FOR THE VERY BASICS LIKE FOOD, HOUSING, MEDICAL CARE, AND HEATING?: NOT HARD AT ALL

## 2023-07-11 SDOH — ECONOMIC STABILITY: TRANSPORTATION INSECURITY
IN THE PAST 12 MONTHS, HAS LACK OF RELIABLE TRANSPORTATION KEPT YOU FROM MEDICAL APPOINTMENTS, MEETINGS, WORK OR FROM GETTING THINGS NEEDED FOR DAILY LIVING?: NO

## 2023-07-11 SDOH — ECONOMIC STABILITY: HOUSING INSECURITY: IN THE LAST 12 MONTHS, HOW MANY PLACES HAVE YOU LIVED?: 1

## 2023-07-11 SDOH — ECONOMIC STABILITY: HOUSING INSECURITY

## 2023-07-11 SDOH — ECONOMIC STABILITY: FOOD INSECURITY: WITHIN THE PAST 12 MONTHS, THE FOOD YOU BOUGHT JUST DIDN'T LAST AND YOU DIDN'T HAVE MONEY TO GET MORE.: NEVER TRUE

## 2023-07-11 SDOH — ECONOMIC STABILITY: TRANSPORTATION INSECURITY
IN THE PAST 12 MONTHS, HAS LACK OF TRANSPORTATION KEPT YOU FROM MEETINGS, WORK, OR FROM GETTING THINGS NEEDED FOR DAILY LIVING?: NO

## 2023-07-11 SDOH — HEALTH STABILITY: PHYSICAL HEALTH: ON AVERAGE, HOW MANY MINUTES DO YOU ENGAGE IN EXERCISE AT THIS LEVEL?: 0 MIN

## 2023-07-11 SDOH — HEALTH STABILITY: MENTAL HEALTH
STRESS IS WHEN SOMEONE FEELS TENSE, NERVOUS, ANXIOUS, OR CAN'T SLEEP AT NIGHT BECAUSE THEIR MIND IS TROUBLED. HOW STRESSED ARE YOU?: NOT AT ALL

## 2023-07-11 ASSESSMENT — SOCIAL DETERMINANTS OF HEALTH (SDOH)
HOW HARD IS IT FOR YOU TO PAY FOR THE VERY BASICS LIKE FOOD, HOUSING, MEDICAL CARE, AND HEATING?: NOT HARD AT ALL
HOW OFTEN DO YOU HAVE SIX OR MORE DRINKS ON ONE OCCASION: NEVER
HOW OFTEN DO YOU ATTENT MEETINGS OF THE CLUB OR ORGANIZATION YOU BELONG TO?: NEVER
HOW OFTEN DO YOU ATTEND CHURCH OR RELIGIOUS SERVICES?: NEVER
HOW OFTEN DO YOU ATTEND CHURCH OR RELIGIOUS SERVICES?: NEVER
HOW OFTEN DO YOU ATTENT MEETINGS OF THE CLUB OR ORGANIZATION YOU BELONG TO?: NEVER
WITHIN THE PAST 12 MONTHS, YOU WORRIED THAT YOUR FOOD WOULD RUN OUT BEFORE YOU GOT THE MONEY TO BUY MORE: NEVER TRUE
HOW OFTEN DO YOU HAVE A DRINK CONTAINING ALCOHOL: NEVER
HOW MANY DRINKS CONTAINING ALCOHOL DO YOU HAVE ON A TYPICAL DAY WHEN YOU ARE DRINKING: PATIENT DOES NOT DRINK
DO YOU BELONG TO ANY CLUBS OR ORGANIZATIONS SUCH AS CHURCH GROUPS UNIONS, FRATERNAL OR ATHLETIC GROUPS, OR SCHOOL GROUPS?: NO
IN A TYPICAL WEEK, HOW MANY TIMES DO YOU TALK ON THE PHONE WITH FAMILY, FRIENDS, OR NEIGHBORS?: MORE THAN THREE TIMES A WEEK
HOW OFTEN DO YOU GET TOGETHER WITH FRIENDS OR RELATIVES?: MORE THAN THREE TIMES A WEEK
IN A TYPICAL WEEK, HOW MANY TIMES DO YOU TALK ON THE PHONE WITH FAMILY, FRIENDS, OR NEIGHBORS?: MORE THAN THREE TIMES A WEEK
DO YOU BELONG TO ANY CLUBS OR ORGANIZATIONS SUCH AS CHURCH GROUPS UNIONS, FRATERNAL OR ATHLETIC GROUPS, OR SCHOOL GROUPS?: NO
HOW OFTEN DO YOU GET TOGETHER WITH FRIENDS OR RELATIVES?: MORE THAN THREE TIMES A WEEK

## 2023-07-11 ASSESSMENT — LIFESTYLE VARIABLES
HOW MANY STANDARD DRINKS CONTAINING ALCOHOL DO YOU HAVE ON A TYPICAL DAY: PATIENT DOES NOT DRINK
SKIP TO QUESTIONS 9-10: 1
AUDIT-C TOTAL SCORE: 0
HOW OFTEN DO YOU HAVE SIX OR MORE DRINKS ON ONE OCCASION: NEVER
HOW OFTEN DO YOU HAVE A DRINK CONTAINING ALCOHOL: NEVER

## 2023-07-12 ENCOUNTER — OFFICE VISIT (OUTPATIENT)
Dept: MEDICAL GROUP | Facility: PHYSICIAN GROUP | Age: 83
End: 2023-07-12
Payer: MEDICARE

## 2023-07-12 VITALS
TEMPERATURE: 98.4 F | OXYGEN SATURATION: 96 % | HEART RATE: 66 BPM | DIASTOLIC BLOOD PRESSURE: 72 MMHG | HEIGHT: 63 IN | RESPIRATION RATE: 20 BRPM | SYSTOLIC BLOOD PRESSURE: 118 MMHG | WEIGHT: 133.13 LBS | BODY MASS INDEX: 23.59 KG/M2

## 2023-07-12 DIAGNOSIS — R51.9 CHRONIC NONINTRACTABLE HEADACHE, UNSPECIFIED HEADACHE TYPE: ICD-10-CM

## 2023-07-12 DIAGNOSIS — N18.31 STAGE 3A CHRONIC KIDNEY DISEASE: ICD-10-CM

## 2023-07-12 DIAGNOSIS — E78.5 DYSLIPIDEMIA: ICD-10-CM

## 2023-07-12 DIAGNOSIS — G50.0 TIC DOULOUREUX: ICD-10-CM

## 2023-07-12 DIAGNOSIS — F17.210 CIGARETTE NICOTINE DEPENDENCE WITHOUT COMPLICATION: ICD-10-CM

## 2023-07-12 DIAGNOSIS — E03.9 ACQUIRED HYPOTHYROIDISM: ICD-10-CM

## 2023-07-12 DIAGNOSIS — G89.29 CHRONIC NONINTRACTABLE HEADACHE, UNSPECIFIED HEADACHE TYPE: ICD-10-CM

## 2023-07-12 PROBLEM — N18.30 CKD (CHRONIC KIDNEY DISEASE) STAGE 3, GFR 30-59 ML/MIN: Status: ACTIVE | Noted: 2023-07-12

## 2023-07-12 PROCEDURE — 99406 BEHAV CHNG SMOKING 3-10 MIN: CPT | Performed by: INTERNAL MEDICINE

## 2023-07-12 PROCEDURE — 99214 OFFICE O/P EST MOD 30 MIN: CPT | Mod: 25 | Performed by: INTERNAL MEDICINE

## 2023-07-12 PROCEDURE — 3074F SYST BP LT 130 MM HG: CPT | Performed by: INTERNAL MEDICINE

## 2023-07-12 PROCEDURE — 3078F DIAST BP <80 MM HG: CPT | Performed by: INTERNAL MEDICINE

## 2023-07-12 RX ORDER — GABAPENTIN 100 MG/1
100 CAPSULE ORAL 3 TIMES DAILY
Qty: 270 CAPSULE | Refills: 3 | Status: SHIPPED | OUTPATIENT
Start: 2023-07-12

## 2023-07-12 RX ORDER — LEVOTHYROXINE SODIUM 0.07 MG/1
75 TABLET ORAL
Qty: 90 TABLET | Refills: 3 | Status: SHIPPED | OUTPATIENT
Start: 2023-07-12 | End: 2023-09-14

## 2023-07-12 NOTE — PROGRESS NOTES
PRIMARY CARE CLINIC VISIT    Chief complaint:    New patient here to establish care  Follow-up CKD 3  Tic [place]  Hypothyroidism  Hyperlipidemia  Headaches  Smoking      History of Present Illness     Cigarette nicotine dependence without complication  Chronic condition.     Attestation: I spent 5 minutes of face to face counseling pt regarding nicotine cessation.   Discussed w pt and counseling on harmful effects of nicotine.     Strongly advised pt to quit.      CKD (chronic kidney disease) stage 3, GFR 30-59 ml/min ([facility])  This is a chronic condition.  Previous GFR 40 to 50s.  Advised the patient the result.  Recommend patient to avoid NSAIDs.    Tic [place]  This is a chronic ongoing condition.  The patient is taking gabapentin 100 mg up to 3 times a day.  Most of the day patient only take 1 or 2.  She will get pain from her temple area down to her jaw.  Treatment is well controlled.    Acquired hypothyroidism  Chronic condition.  The patient is taking levothyroxine 75 mcg daily.  Recent TSH result discussed with the patient.  Patient tolerating medication well    Dyslipidemia  This is a chronic condition but the patient currently on diet therapy.  Lab test result discussed with the patient.    Headache  This is a new condition.  The patient reported that while walking the patient excellently slipped and fell striking her head [place].  Patient has had recurrent headache since that time.  Headache is in the right occipital region.  Patient denies change in vision motor weakness paresthesia she is she denies slurred speech.    Current Outpatient Medications on File Prior to Visit   Medication Sig Dispense Refill    diclofenac sodium (VOLTAREN) 1 % Gel Apply 2 g topically 4 times a day as needed (Pain). 50 g 1     No current facility-administered medications on file prior to visit.        Allergies: Oxycodone-acetaminophen    Current Outpatient Medications Ordered in Epic   Medication Sig Dispense  "Refill    levothyroxine (SYNTHROID) 75 MCG Tab Take 1 Tablet by mouth every morning on an empty stomach. 90 Tablet 3    gabapentin (NEURONTIN) 100 MG Cap Take 1 Capsule by mouth 3 times a day. 270 Capsule 3    diclofenac sodium (VOLTAREN) 1 % Gel Apply 2 g topically 4 times a day as needed (Pain). 50 g 1     No current [facility]-ordered facility-administered medications on file.       History reviewed. No pertinent past medical history.    Past Surgical History:   Procedure Laterality Date    ABDOMINAL HYSTERECTOMY TOTAL      SINUSCOPY         Family History   Problem Relation Age of Onset    Stroke Mother     Diabetes Brother     Lung Disease Neg Hx     Cancer Neg Hx     Hypertension Neg Hx        Social History     Tobacco Use   Smoking Status Every Day    Packs/day: 0.50    Types: Cigarettes   Smokeless Tobacco Never       Social History     Substance and Sexual Activity   Alcohol Use Not Currently       Review of systems.  As per HPI above. All other systems reviewed and negative.      Past Medical, Social, and Family history reviewed and updated in EPIC     Objective     /72 (BP Location: Left arm, Patient Position: Sitting, BP Cuff Size: Adult)   Pulse 66   Temp 36.9 °C (98.4 °F) (Temporal)   Resp 20   Ht 1.6 m (5' 3\")   Wt 60.4 kg (133 lb 2 oz)   SpO2 96%    Body mass index is 23.58 kg/m².    General: alert in no apparent distress.  Cardiovascular: regular rate and rhythm  Pulmonary: lungs : no wheezing   Gastrointestinal: BS present. No obvious mass noted  Head normocephalic atraumatic.  Cranial nerves II through grossly intact.  Gait no ataxia noted.          Lab Results   Component Value Date/Time    SODIUM 142 06/20/2023 09:52 AM    POTASSIUM 4.1 06/20/2023 09:52 AM    GLUCOSE 86 06/20/2023 09:52 AM    BUN 14 06/20/2023 09:52 AM    CREATININE 1.18 06/20/2023 09:52 AM       Lab Results   Component Value Date/Time    CHOLSTRLTOT 190 06/20/2023 09:52 AM    TRIGLYCERIDE 132 06/20/2023 09:52 AM    HDL " 38 (A) 06/20/2023 09:52 AM     (H) 06/20/2023 09:52 AM       Lab Results   Component Value Date/Time    ALTSGPT 9 06/20/2023 09:52 AM             Assessment and Plan     1. Acquired hypothyroidism  - levothyroxine (SYNTHROID) 75 MCG Tab; Take 1 Tablet by mouth every morning on an empty stomach.  Dispense: 90 Tablet; Refill: 3  This is a chronic and stable condition.  Continue with levothyroxine 75 mcg daily.    2. Stage 3a chronic kidney disease (HCC)  Chronic stable condition.  Advised the patient to avoid NSAID.  Continue to monitor.    3. Tic [place]  - gabapentin (NEURONTIN) 100 MG Cap; Take 1 Capsule by mouth 3 times a day.  Dispense: 270 Capsule; Refill: 3  Chronic stable condition.  Continue gabapentin 100 mg 3 times a day as needed.  Potential side effect of medication discussed with the patient.    4. Dyslipidemia  This is a chronic condition.  Lab test result discussed with the patient.  Recommended low-fat low-cholesterol diet.  Recommend to repeat lipid panel next lab draw.    5. Chronic nonintractable headache, unspecified headache type  This is a new condition.  Unstable.  Patient reported recurrent headaches since October 2022  - CT-HEAD W/O; Future  reCommend follow-up after CT is done.    6. Cigarette nicotine dependence without complication  Chronic condition.  Uncontrolled.  Strongly advised the patient to quit smoking.        Attestation: I spent 5 minutes of face to face counseling pt regarding nicotine cessation.   Discussed w pt and counseling on harmful effects of nicotine.     Strongly advised pt to quit.     Attestation: I spent:   32   min -  That includes time for chart review before the visit, the actual patient visit, and time spent on documentation in EMR after the visit.  Chart review/prep, review of other providers' records, imaging/lab review, face-to-face time for history/examination, ordering, prescribing,  review of results/meds/ treatment plan with patient, and care  coordination.                 Healthcare Maintenance       Health Maintenance Due   Topic Date Due    Annual Wellness Visit  Never done    COVID-19 Vaccine (3 - Pfizer series) 04/05/2023               Please note that this dictation was created using voice recognition software. I have made every reasonable attempt to correct obvious errors, but I expect that there are errors of grammar and possibly content that I did not discover before finalizing the note.    Zain Leung MD  Internal Medicine  [facility]

## 2023-07-12 NOTE — ASSESSMENT & PLAN NOTE
This is a chronic ongoing condition.  The patient is taking gabapentin 100 mg up to 3 times a day.  Most of the day patient only take 1 or 2.  She will get pain from her temple area down to her jaw.  Treatment is well controlled.

## 2023-07-12 NOTE — ASSESSMENT & PLAN NOTE
This is a chronic condition.  Previous GFR 40 to 50s.  Advised the patient the result.  Recommend patient to avoid NSAIDs.

## 2023-07-12 NOTE — ASSESSMENT & PLAN NOTE
Chronic condition.  The patient is taking levothyroxine 75 mcg daily.  Recent TSH result discussed with the patient.  Patient tolerating medication well

## 2023-07-12 NOTE — ASSESSMENT & PLAN NOTE
This is a chronic condition but the patient currently on diet therapy.  Lab test result discussed with the patient.

## 2023-07-12 NOTE — ASSESSMENT & PLAN NOTE
This is a new condition.  The patient reported that while walking the patient excellently slipped and fell striking her head [place].  Patient has had recurrent headache since that time.  Headache is in the right occipital region.  Patient denies change in vision motor weakness paresthesia she is she denies slurred speech.

## 2023-07-14 ENCOUNTER — NON-PROVIDER VISIT (OUTPATIENT)
Dept: CARDIOLOGY | Facility: MEDICAL CENTER | Age: 83
End: 2023-07-14
Attending: INTERNAL MEDICINE
Payer: MEDICARE

## 2023-07-14 DIAGNOSIS — R00.2 PALPITATIONS: ICD-10-CM

## 2023-07-14 DIAGNOSIS — I49.3 PVCS (PREMATURE VENTRICULAR CONTRACTIONS): ICD-10-CM

## 2023-07-14 DIAGNOSIS — I47.10 SVT (SUPRAVENTRICULAR TACHYCARDIA) (HCC): ICD-10-CM

## 2023-07-14 PROCEDURE — 93246 EXT ECG>7D<15D RECORDING: CPT

## 2023-07-14 NOTE — PROGRESS NOTES
Patient enrolled in the 14 day ePatch Holter monitoring program per Arcelia Hooper MD.  >Office hook-up, serial # [id number].  >Currently pending EOS.

## 2023-07-19 ENCOUNTER — HOSPITAL ENCOUNTER (OUTPATIENT)
Dept: RADIOLOGY | Facility: MEDICAL CENTER | Age: 83
End: 2023-07-19
Attending: INTERNAL MEDICINE
Payer: MEDICARE

## 2023-07-19 DIAGNOSIS — R51.9 CHRONIC NONINTRACTABLE HEADACHE, UNSPECIFIED HEADACHE TYPE: ICD-10-CM

## 2023-07-19 DIAGNOSIS — G89.29 CHRONIC NONINTRACTABLE HEADACHE, UNSPECIFIED HEADACHE TYPE: ICD-10-CM

## 2023-07-19 PROCEDURE — 70450 CT HEAD/BRAIN W/O DYE: CPT

## 2023-08-07 ENCOUNTER — TELEPHONE (OUTPATIENT)
Dept: CARDIOLOGY | Facility: MEDICAL CENTER | Age: 83
End: 2023-08-07
Payer: MEDICARE

## 2023-08-07 PROCEDURE — 93248 EXT ECG>7D<15D REV&INTERPJ: CPT | Performed by: INTERNAL MEDICINE

## 2023-08-08 ENCOUNTER — TELEPHONE (OUTPATIENT)
Dept: CARDIOLOGY | Facility: MEDICAL CENTER | Age: 83
End: 2023-08-08
Payer: MEDICARE

## 2023-08-08 NOTE — TELEPHONE ENCOUNTER
Arcelia Hooper M.D.: Pietro Isbell and Cristina, can you let the patient know her event monitor actually looked fine.  There is no episodes of A-fib.  She did have extra beats and short atrial runs which is not life-threatening.  They were not really associate with any symptoms.  1 did last a little bit longer about 23 minutes.  We can try a low-dose metoprolol 12.5 mg p.o. twice daily if patient would like to try.  Also to cut back caffeine if she is taking any and lifestyle changes.  Thanks      Phone Number Called: [phone]    Call outcome: Spoke to patient regarding message below.    Message:     Called pt and relayed 's recommendations to pt. Pt verbalized understanding and would like to hold off on medication at this time. Pt states that she does not drink alcohol and she only drinks one cup of tea 4-5 days/week and a cup of coffee maybe once/month. Pt states that she would rather just see how she feels for a while and call us back if she decides she wants to start the medication. Pt very appreciative of call.

## 2023-08-31 ENCOUNTER — OFFICE VISIT (OUTPATIENT)
Dept: MEDICAL GROUP | Facility: PHYSICIAN GROUP | Age: 83
End: 2023-08-31
Payer: MEDICARE

## 2023-08-31 VITALS
BODY MASS INDEX: 23.28 KG/M2 | HEIGHT: 63 IN | DIASTOLIC BLOOD PRESSURE: 70 MMHG | TEMPERATURE: 97.3 F | RESPIRATION RATE: 20 BRPM | SYSTOLIC BLOOD PRESSURE: 104 MMHG | HEART RATE: 74 BPM | OXYGEN SATURATION: 97 % | WEIGHT: 131.38 LBS

## 2023-08-31 DIAGNOSIS — R09.89 BRUIT OF RIGHT CAROTID ARTERY: ICD-10-CM

## 2023-08-31 DIAGNOSIS — F17.210 CIGARETTE NICOTINE DEPENDENCE WITHOUT COMPLICATION: ICD-10-CM

## 2023-08-31 DIAGNOSIS — E78.5 DYSLIPIDEMIA: ICD-10-CM

## 2023-08-31 DIAGNOSIS — L08.9 SKIN INFECTION: ICD-10-CM

## 2023-08-31 DIAGNOSIS — E03.9 ACQUIRED HYPOTHYROIDISM: ICD-10-CM

## 2023-08-31 DIAGNOSIS — R55 NEAR SYNCOPE: ICD-10-CM

## 2023-08-31 DIAGNOSIS — N18.31 STAGE 3A CHRONIC KIDNEY DISEASE: ICD-10-CM

## 2023-08-31 PROBLEM — R51.9 HEADACHE: Status: RESOLVED | Noted: 2023-07-12 | Resolved: 2023-08-31

## 2023-08-31 PROCEDURE — 99406 BEHAV CHNG SMOKING 3-10 MIN: CPT | Performed by: INTERNAL MEDICINE

## 2023-08-31 PROCEDURE — 93000 ELECTROCARDIOGRAM COMPLETE: CPT | Performed by: INTERNAL MEDICINE

## 2023-08-31 PROCEDURE — 99214 OFFICE O/P EST MOD 30 MIN: CPT | Mod: 25 | Performed by: INTERNAL MEDICINE

## 2023-08-31 PROCEDURE — 3078F DIAST BP <80 MM HG: CPT | Performed by: INTERNAL MEDICINE

## 2023-08-31 PROCEDURE — 3074F SYST BP LT 130 MM HG: CPT | Performed by: INTERNAL MEDICINE

## 2023-08-31 RX ORDER — CEPHALEXIN 500 MG/1
500 CAPSULE ORAL 3 TIMES DAILY
Qty: 21 CAPSULE | Refills: 0 | Status: SHIPPED | OUTPATIENT
Start: 2023-08-31 | End: 2023-12-19

## 2023-08-31 NOTE — ASSESSMENT & PLAN NOTE
Chronic condition.     Attestation: I spent 5 minutes of face to face counseling pt regarding nicotine cessation.   Discussed w pt and counseling on harmful effects of nicotine.     Strongly advised pt to quit.

## 2023-08-31 NOTE — PROGRESS NOTES
PRIMARY CARE CLINIC VISIT    Chief complaint:    Near fainting spells  skin infection  CKD 3  Hypothyroidism  Hyperlipidemia        History of Present Illness     Near syncope  This is a chronic recurrent condition noted since the last several months.  The patient reported near syncopal episode occurring approximately once or twice a month.  Patient denies history of head trauma or injury.  Patient denies motor weakness paresthesia or slurred speech.  These episodes may occur at random time without any warning.  Patient denies complete loss of consciousness.  She denies chest pain shortness of breath  Patient reported episode of palpitation.  The patient has been seen by cardiologist recently and is scheduled to undergo an echocardiogram the patient has not set up the appointment for that.    She had a event recorder done recently.  No significant findings reported.  Result discussed with the patient.  Findings:   1.  Predominant rhythm is sinus, range 45 to 108 bpm.   2.  Rare premature atrial/supraventricular beats (1.29%).  Rare couplets.  Rare premature ventricular beats, 0.02%.   3.  1824 supraventricular/atrial runs, longest lasted 23 minutes on day 13 at 2:14 PM, unfortunately rhythm strips was not displayed.   4.  There were 44 patient triggered events.  Symptoms reported were palpitations, heart fluttering, skipping beats, lightheadedness and dizziness most corresponding to sinus rhythm.     Impression  Performed by: ECG EPIPHANY  1.  Predominant rhythm is sinus.   2.  Occasional supraventricular/atrial runs, longest is last 23 minutes not associated with symptoms.   3.  Reported symptoms corresponding to sinus rhythm.                 Last episode of near syncope was approximately 2 weeks ago.  Currently the patient asymptomatic.    Patient did have head CT scan done recently which was unremarkable.  Result discussed with the patient    Skin infection  New condition.  Patient reported skin lesion in the  anterior abdominal wall region noted since last couple weeks  Patient has been using Neosporin ointment and hydrogen peroxide with some improvement.  Patient denies fever or chills.    CKD (chronic kidney disease) stage 3, GFR 30-59 ml/min ([facility])  Chronic condition.  Previous GFR 40s to 50s.  Advised the patient to avoid NSAID.  Lab test ordered for follow-up    Acquired hypothyroidism  Chronic condition.  Patient is taking levothyroxine 75 mcg daily.  Patient tolerated medication well.    Dyslipidemia  Chronic condition.  Patient is currently on diet therapy.  Blood test requested for follow-up.    Cigarette nicotine dependence without complication  Chronic condition.     Attestation: I spent 5 minutes of face to face counseling pt regarding nicotine cessation.   Discussed w pt and counseling on harmful effects of nicotine.     Strongly advised pt to quit.        Current Outpatient Medications on File Prior to Visit   Medication Sig Dispense Refill    levothyroxine (SYNTHROID) 75 MCG Tab Take 1 Tablet by mouth every morning on an empty stomach. 90 Tablet 3    gabapentin (NEURONTIN) 100 MG Cap Take 1 Capsule by mouth 3 times a day. 270 Capsule 3    diclofenac sodium (VOLTAREN) 1 % Gel Apply 2 g topically 4 times a day as needed (Pain). 50 g 1     No current facility-administered medications on file prior to visit.        Allergies: Oxycodone-acetaminophen    Current Outpatient Medications Ordered in Epic   Medication Sig Dispense Refill    cephALEXin (KEFLEX) 500 MG Cap Take 1 Capsule by mouth 3 times a day. 21 Capsule 0    levothyroxine (SYNTHROID) 75 MCG Tab Take 1 Tablet by mouth every morning on an empty stomach. 90 Tablet 3    gabapentin (NEURONTIN) 100 MG Cap Take 1 Capsule by mouth 3 times a day. 270 Capsule 3    diclofenac sodium (VOLTAREN) 1 % Gel Apply 2 g topically 4 times a day as needed (Pain). 50 g 1     No current [facility]-ordered facility-administered medications on file.       History reviewed. No  "pertinent past medical history.    Past Surgical History:   Procedure Laterality Date    ABDOMINAL HYSTERECTOMY TOTAL      SINUSCOPY         Family History   Problem Relation Age of Onset    Stroke Mother     Diabetes Brother     Lung Disease Neg Hx     Cancer Neg Hx     Hypertension Neg Hx        Social History     Tobacco Use   Smoking Status Every Day    Current packs/day: 0.50    Types: Cigarettes   Smokeless Tobacco Never       Social History     Substance and Sexual Activity   Alcohol Use Not Currently       Review of systems.  As per HPI above. All other systems reviewed and negative.      Past Medical, Social, and Family history reviewed and updated in EPIC     Objective     /70   Pulse 74   Temp 36.3 °C (97.3 °F) (Temporal)   Resp 20   Ht 1.6 m (5' 3\")   Wt 59.6 kg (131 lb 6 oz)   SpO2 97%    Body mass index is 23.27 kg/m².    General: alert in no apparent distress.  Cardiovascular: regular rate and rhythm  Pulmonary: lungs : no wheezing   Gastrointestinal: BS present. No obvious mass noted  Right-sided abdominal showed there is a superficial ulceration lesion measuring approximately 1 cm in diameter no discharge noted there is surrounding erythema.  No fluctuance.        Lab Results   Component Value Date/Time    SODIUM 142 06/20/2023 09:52 AM    POTASSIUM 4.1 06/20/2023 09:52 AM    GLUCOSE 86 06/20/2023 09:52 AM    BUN 14 06/20/2023 09:52 AM    CREATININE 1.18 06/20/2023 09:52 AM       Lab Results   Component Value Date/Time    CHOLSTRLTOT 190 06/20/2023 09:52 AM    TRIGLYCERIDE 132 06/20/2023 09:52 AM    HDL 38 (A) 06/20/2023 09:52 AM     (H) 06/20/2023 09:52 AM       Lab Results   Component Value Date/Time    ALTSGPT 9 06/20/2023 09:52 AM             Assessment and Plan     1. Near syncope  Chronic recurrent condition.  Uncontrolled.  This episode has been occurring a regular basis since the last several months.  Exact cause unclear.  Further investigation is warranted.  Exam today " show patient with carotid bruit    - EKG  I independently interpreted the patient's ekg: Sinus rhythm.  Rate is 58 bpm abnormal R wave progression.  Nonspecific T wave abnormality.  No acute injury pattern noted.  Test ordered today including CBC chemistry.  Carotid ultrasound also requested.  Refer the patient to neurology for further evaluation.  Advised the patient to follow-up with cardiologist.  Stressed important to schedule the appointment of echocardiogram also.  - US-CAROTID DOPPLER BILAT; Future  - Referral to Neurology  Follow-up after the above said done  Patient to go ER if symptoms recur.    2. Stage 3a chronic kidney disease (HCC)  - Basic Metabolic Panel; Future  Chronic condition.  Current status unclear.  Lab test requested for follow-up.  Advised the patient to avoid NSAID.    3. Acquired hypothyroidism  - TSH; Future  Chronic condition.  Current status unclear.  Lab test ordered.  Continue levothyroxine 75 mcg daily for    4. Dyslipidemia  - Lipid Profile; Future  Chronic condition.  Current status unclear.  Lipid panel requested.  Recommend low-fat low-cholesterol diet.    5. Skin infection  This is a new condition.  Unstable.  Advised the patient to start Keflex 500 mg p.o. 3 times daily.  Recommend office follow-up in 7 days for wound recheck.  The patient to continue with dressing change twice daily and to use topical antibiotic such as Neosporin.      6. Cigarette nicotine dependence without complication  Chronic condition.  Uncontrolled.  Advised the patient to quit smoking completely.      7. Bruit of right carotid artery  - US-CAROTID DOPPLER BILAT; Future    Other orders  - cephALEXin (KEFLEX) 500 MG Cap; Take 1 Capsule by mouth 3 times a day.  Dispense: 21 Capsule; Refill: 0            Attestation: I spent 5 minutes of face to face counseling pt regarding nicotine cessation.   Discussed w pt and counseling on harmful effects of nicotine.     Strongly advised pt to quit.     Attestation:  I spent:   33  min -  That includes time for chart review before the visit, the actual patient visit, and time spent on documentation in EMR after the visit.  Chart review/prep, review of other providers' records, imaging/lab review, face-to-face time for history/examination, pt's counseling/education, ordering, prescribing,  review of results/meds/ treatment plan with patient, and care coordination.           Healthcare Maintenance       Health Maintenance Due   Topic Date Due    Annual Wellness Visit  Never done    Zoster (Shingles) Vaccines (1 of 2) Never done    COVID-19 Vaccine (3 - Pfizer series) 04/05/2023    Influenza Vaccine (1) 09/01/2023               Please note that this dictation was created using voice recognition software. I have made every reasonable attempt to correct obvious errors, but I expect that there are errors of grammar and possibly content that I did not discover before finalizing the note.    Zain Leung MD  Internal Medicine  [facility]

## 2023-08-31 NOTE — ASSESSMENT & PLAN NOTE
Chronic condition.  Previous GFR 40s to 50s.  Advised the patient to avoid NSAID.  Lab test ordered for follow-up

## 2023-08-31 NOTE — ASSESSMENT & PLAN NOTE
This is a chronic recurrent condition noted since the last several months.  The patient reported near syncopal episode occurring approximately once or twice a month.  Patient denies history of head trauma or injury.  Patient denies motor weakness paresthesia or slurred speech.  These episodes may occur at random time without any warning.  Patient denies complete loss of consciousness.  She denies chest pain shortness of breath  Patient reported episode of palpitation.  The patient has been seen by cardiologist recently and is scheduled to undergo an echocardiogram the patient has not set up the appointment for that.    She had a event recorder done recently.  No significant findings reported.  Result discussed with the patient.  Findings:   1.  Predominant rhythm is sinus, range 45 to 108 bpm.   2.  Rare premature atrial/supraventricular beats (1.29%).  Rare couplets.  Rare premature ventricular beats, 0.02%.   3.  1824 supraventricular/atrial runs, longest lasted 23 minutes on day 13 at 2:14 PM, unfortunately rhythm strips was not displayed.   4.  There were 44 patient triggered events.  Symptoms reported were palpitations, heart fluttering, skipping beats, lightheadedness and dizziness most corresponding to sinus rhythm.     Impression  Performed by: ECG EPIPHANY  1.  Predominant rhythm is sinus.   2.  Occasional supraventricular/atrial runs, longest is last 23 minutes not associated with symptoms.   3.  Reported symptoms corresponding to sinus rhythm.                 Last episode of near syncope was approximately 2 weeks ago.  Currently the patient asymptomatic.    Patient did have head CT scan done recently which was unremarkable.  Result discussed with the patient

## 2023-08-31 NOTE — ASSESSMENT & PLAN NOTE
New condition.  Patient reported skin lesion in the anterior abdominal wall region noted since last couple weeks  Patient has been using Neosporin ointment and hydrogen peroxide with some improvement.  Patient denies fever or chills.

## 2023-09-08 ENCOUNTER — TELEPHONE (OUTPATIENT)
Dept: HEALTH INFORMATION MANAGEMENT | Facility: OTHER | Age: 83
End: 2023-09-08
Payer: MEDICARE

## 2023-09-13 ENCOUNTER — HOSPITAL ENCOUNTER (OUTPATIENT)
Dept: LAB | Facility: MEDICAL CENTER | Age: 83
End: 2023-09-13
Attending: INTERNAL MEDICINE
Payer: MEDICARE

## 2023-09-13 DIAGNOSIS — R55 NEAR SYNCOPE: ICD-10-CM

## 2023-09-13 DIAGNOSIS — N18.31 STAGE 3A CHRONIC KIDNEY DISEASE: ICD-10-CM

## 2023-09-13 DIAGNOSIS — E03.9 ACQUIRED HYPOTHYROIDISM: ICD-10-CM

## 2023-09-13 DIAGNOSIS — E78.5 DYSLIPIDEMIA: ICD-10-CM

## 2023-09-13 PROCEDURE — 87086 URINE CULTURE/COLONY COUNT: CPT | Mod: GA

## 2023-09-13 PROCEDURE — 84443 ASSAY THYROID STIM HORMONE: CPT

## 2023-09-13 PROCEDURE — 80048 BASIC METABOLIC PNL TOTAL CA: CPT

## 2023-09-13 PROCEDURE — 85027 COMPLETE CBC AUTOMATED: CPT

## 2023-09-13 PROCEDURE — 36415 COLL VENOUS BLD VENIPUNCTURE: CPT

## 2023-09-13 PROCEDURE — 80061 LIPID PANEL: CPT

## 2023-09-14 ENCOUNTER — TELEPHONE (OUTPATIENT)
Dept: HEALTH INFORMATION MANAGEMENT | Facility: OTHER | Age: 83
End: 2023-09-14
Payer: MEDICARE

## 2023-09-14 DIAGNOSIS — N18.31 STAGE 3A CHRONIC KIDNEY DISEASE: Chronic | ICD-10-CM

## 2023-09-14 DIAGNOSIS — E03.9 ACQUIRED HYPOTHYROIDISM: ICD-10-CM

## 2023-09-14 PROBLEM — N18.30 CKD (CHRONIC KIDNEY DISEASE) STAGE 3, GFR 30-59 ML/MIN: Chronic | Status: ACTIVE | Noted: 2023-07-12

## 2023-09-14 LAB
ANION GAP SERPL CALC-SCNC: 8 MMOL/L (ref 7–16)
BUN SERPL-MCNC: 21 MG/DL (ref 8–22)
CALCIUM SERPL-MCNC: 9.6 MG/DL (ref 8.5–10.5)
CHLORIDE SERPL-SCNC: 108 MMOL/L (ref 96–112)
CHOLEST SERPL-MCNC: 184 MG/DL (ref 100–199)
CO2 SERPL-SCNC: 23 MMOL/L (ref 20–33)
CREAT SERPL-MCNC: 1.4 MG/DL (ref 0.5–1.4)
ERYTHROCYTE [DISTWIDTH] IN BLOOD BY AUTOMATED COUNT: 49.1 FL (ref 35.9–50)
FASTING STATUS PATIENT QL REPORTED: NORMAL
GFR SERPLBLD CREATININE-BSD FMLA CKD-EPI: 37 ML/MIN/1.73 M 2
GLUCOSE SERPL-MCNC: 92 MG/DL (ref 65–99)
HCT VFR BLD AUTO: 46 % (ref 37–47)
HDLC SERPL-MCNC: 42 MG/DL
HGB BLD-MCNC: 14.5 G/DL (ref 12–16)
LDLC SERPL CALC-MCNC: 118 MG/DL
MCH RBC QN AUTO: 30.2 PG (ref 27–33)
MCHC RBC AUTO-ENTMCNC: 31.5 G/DL (ref 32.2–35.5)
MCV RBC AUTO: 95.8 FL (ref 81.4–97.8)
PLATELET # BLD AUTO: 204 K/UL (ref 164–446)
PMV BLD AUTO: 10.1 FL (ref 9–12.9)
POTASSIUM SERPL-SCNC: 4.5 MMOL/L (ref 3.6–5.5)
RBC # BLD AUTO: 4.8 M/UL (ref 4.2–5.4)
SODIUM SERPL-SCNC: 139 MMOL/L (ref 135–145)
TRIGL SERPL-MCNC: 118 MG/DL (ref 0–149)
TSH SERPL DL<=0.005 MIU/L-ACNC: 0.13 UIU/ML (ref 0.38–5.33)
WBC # BLD AUTO: 7.4 K/UL (ref 4.8–10.8)

## 2023-09-14 RX ORDER — LEVOTHYROXINE SODIUM 0.05 MG/1
50 TABLET ORAL
Qty: 90 TABLET | Refills: 3 | Status: SHIPPED | OUTPATIENT
Start: 2023-09-14

## 2023-09-15 LAB
BACTERIA UR CULT: NORMAL
SIGNIFICANT IND 70042: NORMAL
SITE SITE: NORMAL
SOURCE SOURCE: NORMAL

## 2023-09-26 ENCOUNTER — HOSPITAL ENCOUNTER (OUTPATIENT)
Dept: RADIOLOGY | Facility: MEDICAL CENTER | Age: 83
End: 2023-09-26
Attending: INTERNAL MEDICINE
Payer: MEDICARE

## 2023-09-26 ENCOUNTER — HOSPITAL ENCOUNTER (OUTPATIENT)
Dept: RADIOLOGY | Facility: MEDICAL CENTER | Age: 83
End: 2023-09-26
Attending: NURSE PRACTITIONER
Payer: MEDICARE

## 2023-09-26 DIAGNOSIS — R09.89 BRUIT OF RIGHT CAROTID ARTERY: ICD-10-CM

## 2023-09-26 DIAGNOSIS — M54.2 NECK PAIN: ICD-10-CM

## 2023-09-26 DIAGNOSIS — R55 NEAR SYNCOPE: ICD-10-CM

## 2023-09-26 PROCEDURE — 72040 X-RAY EXAM NECK SPINE 2-3 VW: CPT

## 2023-09-26 PROCEDURE — 93880 EXTRACRANIAL BILAT STUDY: CPT

## 2023-09-27 ENCOUNTER — OFFICE VISIT (OUTPATIENT)
Dept: NEUROLOGY | Facility: MEDICAL CENTER | Age: 83
End: 2023-09-27
Attending: INTERNAL MEDICINE
Payer: MEDICARE

## 2023-09-27 VITALS
HEART RATE: 67 BPM | RESPIRATION RATE: 18 BRPM | WEIGHT: 131.84 LBS | SYSTOLIC BLOOD PRESSURE: 100 MMHG | HEIGHT: 63 IN | OXYGEN SATURATION: 94 % | DIASTOLIC BLOOD PRESSURE: 62 MMHG | BODY MASS INDEX: 23.36 KG/M2 | TEMPERATURE: 97.6 F

## 2023-09-27 DIAGNOSIS — R55 NEAR SYNCOPE: ICD-10-CM

## 2023-09-27 DIAGNOSIS — I65.23 BILATERAL CAROTID ARTERY STENOSIS: ICD-10-CM

## 2023-09-27 DIAGNOSIS — M54.2 CERVICALGIA: ICD-10-CM

## 2023-09-27 PROCEDURE — 99205 OFFICE O/P NEW HI 60 MIN: CPT | Performed by: INTERNAL MEDICINE

## 2023-09-27 PROCEDURE — 99211 OFF/OP EST MAY X REQ PHY/QHP: CPT | Performed by: INTERNAL MEDICINE

## 2023-09-27 PROCEDURE — 3078F DIAST BP <80 MM HG: CPT | Performed by: INTERNAL MEDICINE

## 2023-09-27 PROCEDURE — 3074F SYST BP LT 130 MM HG: CPT | Performed by: INTERNAL MEDICINE

## 2023-09-27 ASSESSMENT — FIBROSIS 4 INDEX: FIB4 SCORE: 1.34

## 2023-09-27 NOTE — PROGRESS NOTES
"[facility]  [address] [place] [address], [address]. [place] [address]  Phone: [phone]    Name: Adriana Floyd  : 1940  MRN: [id number]    ASSESSMENT / PLAN       Adriana Floyd is a 83 y.o. RHD female presenting for near syncope spells    Orthostatic hypotension  Suspect a cardiovascular etiology as she does not have any seizure-like episodes or confusion after these events.  They are usually triggered with standing, and relieved with sitting or laying down. no signs of neuropathy or history of diabetes that would suggest a neurogenic orthostatic hypotension.  - check blood pressure if feeling lightheaded  - increase fluid intake and add salt to diet  - careful after eating big meals and after using bathroom  - take time when standing in case of feeling lightheaded  - follow up with PCP about echocardiogram      Return if symptoms worsen or fail to improve.      Tae Grady DO  Neurology, Movement Disorders Specialist    BILLING DOCUMENTATION: I spent 60 minutes reviewing the medical record, interviewing and examining the patient, discussing my impression (see \"assessment\" above), and coordinating care.        HISTORY OF PRESENT ILLNESS      Adriana Floyd is a 83 y.o. RHD female presenting for near syncope spells.    She was having lightheaded episodes starting a few months ago.  Sometimes with standing, she can feel like she is about to faint.  It can happen throughout the day for a single day, but can resolve for a few days before happening again.  She does not have any sensation or symptoms prior to these events aside from standing.  Denies any loss of consciousness, twitching, or confusion after the episodes.    She has 1 caffeinated drink in the morning, otherwise has not paid close attention to how much fluid she is drinking.     Fell in : tripped and hit forehead. Pain in right back of head since then.    Workup  Pending echocardiogram    Heart monitor 2023, showing " supraventricular atrial runs, with the most lasting 23 minutes.  Triggered symptoms were corresponding to sinus rhythm    9/26/2023 US Carotid Doppler showed mild atherosclerotic plaque in carotid bulbs and proximal ICA.  No significant stenosis, less than 50%.  Vertebral arteries show anterograde flow.    CT head 7/2023: Reviewed, generalized atrophy as expected for age    No past medical history on file.    Past Surgical History:   Procedure Laterality Date    ABDOMINAL HYSTERECTOMY TOTAL      SINUSCOPY         Family History   Problem Relation Age of Onset    Stroke Mother     Diabetes Brother     Lung Disease Neg Hx     Cancer Neg Hx     Hypertension Neg Hx        Social History     Socioeconomic History    Marital status:      Spouse name: Not on file    Number of children: Not on file    Years of education: Not on file    Highest education level: 12th grade   Occupational History    Not on file   Tobacco Use    Smoking status: Every Day     Current packs/day: 0.50     Types: Cigarettes    Smokeless tobacco: Never   Vaping Use    Vaping Use: Never used   Substance and Sexual Activity    Alcohol use: Not Currently    Drug use: Never    Sexual activity: Not Currently   Other Topics Concern    Not on file   Social History Narrative    Not on file     Social Determinants of Health     Financial Resource Strain: Low Risk  (7/11/2023)    Overall Financial Resource Strain (CARDIA)     Difficulty of Paying Living Expenses: Not hard at all   Food Insecurity: No Food Insecurity (7/11/2023)    Hunger Vital Sign     Worried About Running Out of Food in the Last Year: Never true     Ran Out of Food in the Last Year: Never true   Transportation Needs: No Transportation Needs (7/11/2023)    PRAPARE - Transportation     Lack of Transportation (Medical): No     Lack of Transportation (Non-Medical): No   Physical Activity: Inactive (7/11/2023)    Exercise Vital Sign     Days of Exercise per Week: 0 days     Minutes of  "Exercise per Session: 0 min   Stress: No Stress Concern Present (7/11/2023)    [employer] [place] [employer] - Occupational Stress Questionnaire     Feeling of Stress : Not at all   Social Connections: Socially Isolated (7/11/2023)    Social Connection and Isolation Panel [NHANES]     Frequency of Communication with Friends and Family: More than three times a week     Frequency of Social Gatherings with Friends and Family: More than three times a week     Attends Hoahaoism Services: Never     Active Member of Clubs or Organizations: No     Attends Club or Organization Meetings: Never     Marital Status:    Intimate Partner Violence: Not on file   Housing Stability: Unknown (7/11/2023)    Housing Stability Vital Sign     Unable to Pay for Housing in the Last Year: Not on file     Number of Places Lived in the Last Year: 1     Unstable Housing in the Last Year: No       Current Outpatient Medications   Medication    levothyroxine (SYNTHROID) 50 MCG Tab    cephALEXin (KEFLEX) 500 MG Cap    gabapentin (NEURONTIN) 100 MG Cap     No current facility-administered medications for this visit.       Allergies   Allergen Reactions    Oxycodone-Acetaminophen Vomiting         OBJECTIVE      Vitals:    09/27/23 1410 09/27/23 1412 09/27/23 1418   BP: 100/62 122/80 100/62   BP Location: Left arm Left arm Left arm   Patient Position: Sitting Supine Standing   BP Cuff Size: Adult Adult Adult   Pulse: 78 61 67   Resp: 18     Temp: 36.4 °C (97.6 °F)     TempSrc: Temporal     SpO2: 97% 96% 94%   Weight: 59.8 kg (131 lb 13.4 oz)     Height: 1.6 m (5' 3\")         Physical Exam     General: NAD, appears stated age.      Mental status: speech clear and fluent. Fund of knowledge is good.      Cranial Nerves:  CN2: PERRL.   CN3/4/6: EOMI. There is no nystagmus. Saccades are normal.   CN5: V1-V3 intact to light touch    CN7: Symmetric face.   CN8: Hearing grossly intact.   CN9/10/12: Soft palate and uvula rise symmetrically. " Tongue midline.   CN11: Shoulder shrug intact bilaterally.     Strength  Right Left   Shoulder Abduction  5 5   Elbow Flexion 5 5   Elbow Extension  5 5   Wrist Extension  5 5   Finger Extension  5 5   Hip Flexion  5 5   Knee Extension 5 5   Knee Flexion  5 5   Ankle Dorsiflexion  5 5     Deep Tendon Reflexes: 1+ and symmetric in biceps, brachioradialis, patella. 0 in ankles. Plantar reflexes in flexion.      Sensory: normal to light touch, temperature, and vibration in all extremities.       Cerebellar: No dysmetria with FTN or heel-to-shin.    Gait:   Posture - normal   Base - narrow   Stride length - normal   Arm swing - normal   Speed - normal  Shuffling/freezing - none  U-Turn - normal   Heel, toe, and tandem - normal       DATA / RESULTS          PROCEDURE     N/A

## 2023-09-27 NOTE — PATIENT INSTRUCTIONS
- check blood pressure if feeling lightheaded  - increase fluid intake and add salt to diet  - careful after eating big meals and after using bathroom  - take time when standing in case of feeling lightheaded  - follow up with PCP about echocardiogram

## 2023-10-24 ENCOUNTER — APPOINTMENT (OUTPATIENT)
Dept: CARDIOLOGY | Facility: MEDICAL CENTER | Age: 83
End: 2023-10-24
Attending: INTERNAL MEDICINE
Payer: MEDICARE

## 2023-11-14 ENCOUNTER — HOSPITAL ENCOUNTER (OUTPATIENT)
Dept: CARDIOLOGY | Facility: MEDICAL CENTER | Age: 83
End: 2023-11-14
Attending: INTERNAL MEDICINE
Payer: MEDICARE

## 2023-11-14 DIAGNOSIS — R79.89 ELEVATED BRAIN NATRIURETIC PEPTIDE (BNP) LEVEL: ICD-10-CM

## 2023-11-14 LAB
LV EJECT FRACT  99904: 65
LV EJECT FRACT MOD 2C 99903: 67.34
LV EJECT FRACT MOD 4C 99902: 67.13
LV EJECT FRACT MOD BP 99901: 67.29

## 2023-11-14 PROCEDURE — 93306 TTE W/DOPPLER COMPLETE: CPT

## 2023-11-14 PROCEDURE — 93306 TTE W/DOPPLER COMPLETE: CPT | Mod: 26 | Performed by: INTERNAL MEDICINE

## 2023-11-15 ENCOUNTER — OFFICE VISIT (OUTPATIENT)
Dept: MEDICAL GROUP | Facility: PHYSICIAN GROUP | Age: 83
End: 2023-11-15
Payer: MEDICARE

## 2023-11-15 VITALS
BODY MASS INDEX: 23.04 KG/M2 | RESPIRATION RATE: 16 BRPM | OXYGEN SATURATION: 96 % | DIASTOLIC BLOOD PRESSURE: 58 MMHG | WEIGHT: 130 LBS | HEART RATE: 55 BPM | TEMPERATURE: 97.7 F | SYSTOLIC BLOOD PRESSURE: 112 MMHG | HEIGHT: 63 IN

## 2023-11-15 DIAGNOSIS — G89.29 CHRONIC INTRACTABLE HEADACHE, UNSPECIFIED HEADACHE TYPE: ICD-10-CM

## 2023-11-15 DIAGNOSIS — R51.9 CHRONIC INTRACTABLE HEADACHE, UNSPECIFIED HEADACHE TYPE: ICD-10-CM

## 2023-11-15 DIAGNOSIS — E78.5 DYSLIPIDEMIA: ICD-10-CM

## 2023-11-15 DIAGNOSIS — F17.210 CIGARETTE NICOTINE DEPENDENCE WITHOUT COMPLICATION: ICD-10-CM

## 2023-11-15 DIAGNOSIS — Z02.9 ADMINISTRATIVE ENCOUNTER: ICD-10-CM

## 2023-11-15 DIAGNOSIS — N18.32 STAGE 3B CHRONIC KIDNEY DISEASE: Chronic | ICD-10-CM

## 2023-11-15 DIAGNOSIS — E03.9 ACQUIRED HYPOTHYROIDISM: ICD-10-CM

## 2023-11-15 PROCEDURE — 3074F SYST BP LT 130 MM HG: CPT | Performed by: INTERNAL MEDICINE

## 2023-11-15 PROCEDURE — 99406 BEHAV CHNG SMOKING 3-10 MIN: CPT | Performed by: INTERNAL MEDICINE

## 2023-11-15 PROCEDURE — 3078F DIAST BP <80 MM HG: CPT | Performed by: INTERNAL MEDICINE

## 2023-11-15 PROCEDURE — 99214 OFFICE O/P EST MOD 30 MIN: CPT | Mod: 25 | Performed by: INTERNAL MEDICINE

## 2023-11-15 ASSESSMENT — FIBROSIS 4 INDEX: FIB4 SCORE: 1.34

## 2023-11-15 NOTE — ASSESSMENT & PLAN NOTE
Chronic condition.  Patient currently on diet therapy.  Recent lab test result discussed with the patient.

## 2023-11-15 NOTE — PROGRESS NOTES
PRIMARY CARE CLINIC VISIT        Chief Complaint   Patient presents with     Recurrent headache  Request DMV form to be completed  Follow-up chronic kidney disease  Hypothyroidism  Hyperlipidemia          History of Present Illness     CKD (chronic kidney disease) stage 3, GFR 30-59 ml/min ([facility], located within [facility])  Chronic condition.  Previous GFR in the 40s-50s.  Most recent labs showed GFR 37    Test result discussed with the patient.  I have recommended renal ultrasound to be done and refer the patient to nephrology for consultation.  Patient currently asymptomatic.      Hypothyroidism  Chronic condition.  The patient is taking levothyroxine 50 mcg daily.  Tolerating medication well.    Dyslipidemia  Chronic condition.  Patient currently on diet therapy.  Recent lab test result discussed with the patient.    Headache  This is a chronic condition.  The patient reported recurrent headaches since the last 1 year.  She denies recent trauma or injury.  Headache is noted in the right parietal region with intermittent radiation onto the neck area.  Patient denies motor weakness or paresthesia.  She denies change in vision.  She denies slurred speech patient denies loss of consciousness.  Patient has been taking Tylenol as needed for pain.  Currently the patient asymptomatic.  Patient reported increase in headache frequency since the last few months.    Chart review shows the patient had a CT brain done in July 2023  Results discussed with the patient        CT-HEAD W/O  Order: [id number]  Status: Final result       Visible to patient: Yes (seen)       Next appt: None       Dx: Chronic nonintractable headache, unsp...    1 Result Note       1 Patient Communication  Details    Reading Physician Reading Date Result Priority   Dwight Alejandro M.D.  [phone] 7/20/2023 Routine     Narrative & Impression     7/19/2023 1:47 PM     HISTORY/REASON FOR EXAM:  Frequent headaches, posterior right skull. Chronic nonintractable headache,  unspecified type..        TECHNIQUE/EXAM DESCRIPTION AND NUMBER OF VIEWS:  CT of the head without contrast.     The study was performed on a helical multidetector CT scanner. Contiguous axial sections were obtained from the skull base through the vertex.     Up to date radiation dose reduction adjustments have been utilized to meet ALARA standards for radiation dose reduction.     COMPARISON:  None.     FINDINGS:  Brain: No intracranial mass, hydrocephalus, herniation, hemorrhage, or extra-axial fluid collection. Normal gray-white matter differentiation.     Paranasal sinuses: Visualized portions of the paranasal sinuses and mastoid air cells are well aerated.     IMPRESSION:     No acute process.              Exam Ended: 07/19/23  3:20 PM Last Resulted: 07/20/23  4:23 AM                      Administrative encounter  Patient requests DMV form to be completed today    Cigarette nicotine dependence without complication  Chronic condition for the patient smokes cigarettes daily.  Strongly advised the patient is important to quit smoking.        I spent 5 minutes of face to face counseling pt regarding nicotine cessation.   Discussed w pt and counseling on harmful effects of nicotine.     Strongly advised pt to quit.     Current Outpatient Medications on File Prior to Visit   Medication Sig Dispense Refill    levothyroxine (SYNTHROID) 50 MCG Tab Take 1 Tablet by mouth every morning on an empty stomach. 90 Tablet 3    cephALEXin (KEFLEX) 500 MG Cap Take 1 Capsule by mouth 3 times a day. 21 Capsule 0    gabapentin (NEURONTIN) 100 MG Cap Take 1 Capsule by mouth 3 times a day. 270 Capsule 3     No current facility-administered medications on file prior to visit.        Allergies: Oxycodone-acetaminophen    Current Outpatient Medications Ordered in Epic   Medication Sig Dispense Refill    levothyroxine (SYNTHROID) 50 MCG Tab Take 1 Tablet by mouth every morning on an empty stomach. 90 Tablet 3    cephALEXin (KEFLEX) 500 MG  "Cap Take 1 Capsule by mouth 3 times a day. 21 Capsule 0    gabapentin (NEURONTIN) 100 MG Cap Take 1 Capsule by mouth 3 times a day. 270 Capsule 3     No current [facility]-ordered facility-administered medications on file.       History reviewed. No pertinent past medical history.    Past Surgical History:   Procedure Laterality Date    ABDOMINAL HYSTERECTOMY TOTAL      SINUSCOPY         Family History   Problem Relation Age of Onset    Stroke Mother     Diabetes Brother     Lung Disease Neg Hx     Cancer Neg Hx     Hypertension Neg Hx        Social History     Tobacco Use   Smoking Status Every Day    Current packs/day: 0.50    Types: Cigarettes   Smokeless Tobacco Never       Social History     Substance and Sexual Activity   Alcohol Use Not Currently       Review of systems.  As per HPI above. All other systems reviewed and negative.      Past Medical, Social, and Family history reviewed and updated in EPIC     Objective     /58 (BP Location: Right arm, Patient Position: Sitting, BP Cuff Size: Adult)   Pulse (!) 55   Temp 36.5 °C (97.7 °F) (Temporal)   Resp 16   Ht 1.6 m (5' 3\")   Wt 59 kg (130 lb)   SpO2 96%    Body mass index is 23.03 kg/m².    General: alert in no apparent distress.  Cardiovascular: regular rate and rhythm  Pulmonary: lungs : no wheezing   Gastrointestinal: BS present.   Head normocephalic atraumatic EOMI PERRL conjunctiva normal  Fundi difficult to visualize  Mental status and speech appropriate  Gait no ataxia noted  Cranial nerves II to XII grossly intact  Scalp no pain or discomfort noted to palpation          No results found for: \"HBA1C\"    Lab Results   Component Value Date/Time    WBC 7.4 09/13/2023 11:52 AM    HEMOGLOBIN 14.5 09/13/2023 11:52 AM    HEMATOCRIT 46.0 09/13/2023 11:52 AM    MCV 95.8 09/13/2023 11:52 AM    PLATELETCT 204 09/13/2023 11:52 AM         Lab Results   Component Value Date/Time    SODIUM 139 09/13/2023 11:52 AM    POTASSIUM 4.5 09/13/2023 11:52 AM    " GLUCOSE 92 09/13/2023 11:52 AM    BUN 21 09/13/2023 11:52 AM    CREATININE 1.40 09/13/2023 11:52 AM       Lab Results   Component Value Date/Time    CHOLSTRLTOT 184 09/13/2023 11:52 AM    TRIGLYCERIDE 118 09/13/2023 11:52 AM    HDL 42 09/13/2023 11:52 AM     (H) 09/13/2023 11:52 AM       Lab Results   Component Value Date/Time    ALTSGPT 9 06/20/2023 09:52 AM             Assessment and Plan     1. Chronic intractable headache, unspecified headache type  Chronic worsening condition.  Exam today unremarkable.  The cause unclear.  Brain CT scan negative as above.  - Referral to Neurology  For the patient to go to ER if symptoms worsen or any change in her condition.    2. Stage 3b chronic kidney disease (HCC)  Chronic worsening condition.  GFR with decreasing trends as above.  Renal ultrasound requested.    Advised the patient to avoid NSAID.  - Referral to Nephrology    3. Hypothyroidism  Chronic condition.  Continue levothyroxine 50 mcg daily  Lab test showed low TSH.  I have ordered to repeat the TSH      4. Dyslipidemia  Chronic condition.  Uncontrolled.  LDL level mildly elevated.  Recommend diet and exercise.  Recommend to repeat lipid panel next visit.    5. Administrative encounter    DMV form completed for the patient today    6.  Nicotine dependence.  Strongly advised the patient to quit smoking.      I spent 5 minutes of face to face counseling pt regarding nicotine cessation.   Discussed w pt and counseling on harmful effects of nicotine.     Strongly advised pt to quit.       Attestation: I spent:   33  min -  That includes time for chart review before the visit, the actual patient visit, and time spent on documentation in EMR after the visit.  Chart review/prep, review of other providers' records, imaging/lab review, face-to-face time for history/examination, pt's counseling/education, ordering, prescribing,  review of results/meds/ treatment plan with patient, and care coordination.        Healthcare Maintenance         reCommended but the patient refused influenza vaccine and shingle vaccine        Please note that this dictation was created using voice recognition software. I have made every reasonable attempt to correct obvious errors, but I expect that there are errors of grammar and possibly content that I did not discover before finalizing the note.    Zain Leung MD  Internal Medicine  [facility]

## 2023-11-15 NOTE — ASSESSMENT & PLAN NOTE
Chronic condition.  Previous GFR in the 40s-50s.  Most recent labs showed GFR 37    Test result discussed with the patient.  I have recommended renal ultrasound to be done and refer the patient to nephrology for consultation.  Patient currently asymptomatic.

## 2023-11-15 NOTE — ASSESSMENT & PLAN NOTE
This is a chronic condition.  The patient reported recurrent headaches since the last 1 year.  She denies recent trauma or injury.  Headache is noted in the right parietal region with intermittent radiation onto the neck area.  Patient denies motor weakness or paresthesia.  She denies change in vision.  She denies slurred speech patient denies loss of consciousness.  Patient has been taking Tylenol as needed for pain.  Currently the patient asymptomatic.  Patient reported increase in headache frequency since the last few months.    Chart review shows the patient had a CT brain done in July 2023  Results discussed with the patient        CT-HEAD W/O  Order: [id number]  Status: Final result       Visible to patient: Yes (seen)       Next appt: None       Dx: Chronic nonintractable headache, unsp...    1 Result Note       1 Patient Communication  Details    Reading Physician Reading Date Result Priority   Dwight Alejandro M.D.  [phone] 7/20/2023 Routine     Narrative & Impression     7/19/2023 1:47 PM     HISTORY/REASON FOR EXAM:  Frequent headaches, posterior right skull. Chronic nonintractable headache, unspecified type..        TECHNIQUE/EXAM DESCRIPTION AND NUMBER OF VIEWS:  CT of the head without contrast.     The study was performed on a helical multidetector CT scanner. Contiguous axial sections were obtained from the skull base through the vertex.     Up to date radiation dose reduction adjustments have been utilized to meet ALARA standards for radiation dose reduction.     COMPARISON:  None.     FINDINGS:  Brain: No intracranial mass, hydrocephalus, herniation, hemorrhage, or extra-axial fluid collection. Normal gray-white matter differentiation.     Paranasal sinuses: Visualized portions of the paranasal sinuses and mastoid air cells are well aerated.     IMPRESSION:     No acute process.              Exam Ended: 07/19/23  3:20 PM Last Resulted: 07/20/23  4:23 AM

## 2023-11-15 NOTE — ASSESSMENT & PLAN NOTE
Chronic condition.  The patient is taking levothyroxine 50 mcg daily.  Tolerating medication well.

## 2023-11-16 NOTE — ASSESSMENT & PLAN NOTE
Chronic condition for the patient smokes cigarettes daily.  Strongly advised the patient is important to quit smoking.        I spent 5 minutes of face to face counseling pt regarding nicotine cessation.   Discussed w pt and counseling on harmful effects of nicotine.     Strongly advised pt to quit.

## 2023-11-20 ENCOUNTER — TELEPHONE (OUTPATIENT)
Dept: HEALTH INFORMATION MANAGEMENT | Facility: OTHER | Age: 83
End: 2023-11-20
Payer: MEDICARE

## 2023-11-29 ENCOUNTER — PATIENT MESSAGE (OUTPATIENT)
Dept: HEALTH INFORMATION MANAGEMENT | Facility: OTHER | Age: 83
End: 2023-11-29

## 2023-12-06 ENCOUNTER — HOSPITAL ENCOUNTER (OUTPATIENT)
Dept: LAB | Facility: MEDICAL CENTER | Age: 83
End: 2023-12-06
Attending: INTERNAL MEDICINE
Payer: MEDICARE

## 2023-12-06 DIAGNOSIS — E03.9 ACQUIRED HYPOTHYROIDISM: ICD-10-CM

## 2023-12-06 LAB — TSH SERPL DL<=0.005 MIU/L-ACNC: 0.38 UIU/ML (ref 0.38–5.33)

## 2023-12-06 PROCEDURE — 84443 ASSAY THYROID STIM HORMONE: CPT

## 2023-12-06 PROCEDURE — 36415 COLL VENOUS BLD VENIPUNCTURE: CPT

## 2023-12-11 ENCOUNTER — HOSPITAL ENCOUNTER (OUTPATIENT)
Dept: RADIOLOGY | Facility: MEDICAL CENTER | Age: 83
End: 2023-12-11
Attending: INTERNAL MEDICINE
Payer: MEDICARE

## 2023-12-11 DIAGNOSIS — I71.43 INFRARENAL ABDOMINAL AORTIC ANEURYSM (AAA) WITHOUT RUPTURE (HCC): ICD-10-CM

## 2023-12-11 DIAGNOSIS — N18.31 STAGE 3A CHRONIC KIDNEY DISEASE: Chronic | ICD-10-CM

## 2023-12-11 PROCEDURE — 76775 US EXAM ABDO BACK WALL LIM: CPT

## 2023-12-19 ENCOUNTER — OFFICE VISIT (OUTPATIENT)
Dept: NEPHROLOGY | Facility: MEDICAL CENTER | Age: 83
End: 2023-12-19
Payer: MEDICARE

## 2023-12-19 ENCOUNTER — APPOINTMENT (OUTPATIENT)
Dept: VASCULAR SURGERY | Facility: MEDICAL CENTER | Age: 83
End: 2023-12-19
Payer: MEDICARE

## 2023-12-19 ENCOUNTER — APPOINTMENT (OUTPATIENT)
Dept: NEPHROLOGY | Facility: MEDICAL CENTER | Age: 83
End: 2023-12-19
Payer: MEDICARE

## 2023-12-19 ENCOUNTER — OFFICE VISIT (OUTPATIENT)
Dept: VASCULAR SURGERY | Facility: MEDICAL CENTER | Age: 83
End: 2023-12-19
Payer: MEDICARE

## 2023-12-19 VITALS
HEART RATE: 69 BPM | DIASTOLIC BLOOD PRESSURE: 66 MMHG | TEMPERATURE: 97.9 F | OXYGEN SATURATION: 95 % | BODY MASS INDEX: 23.04 KG/M2 | SYSTOLIC BLOOD PRESSURE: 102 MMHG | HEIGHT: 63 IN | WEIGHT: 130 LBS

## 2023-12-19 VITALS
HEART RATE: 67 BPM | TEMPERATURE: 98.7 F | DIASTOLIC BLOOD PRESSURE: 60 MMHG | WEIGHT: 131 LBS | HEIGHT: 63 IN | SYSTOLIC BLOOD PRESSURE: 100 MMHG | OXYGEN SATURATION: 94 % | BODY MASS INDEX: 23.21 KG/M2

## 2023-12-19 DIAGNOSIS — Z87.440 HISTORY OF UTI: ICD-10-CM

## 2023-12-19 DIAGNOSIS — N18.31 STAGE 3A CHRONIC KIDNEY DISEASE: Chronic | ICD-10-CM

## 2023-12-19 DIAGNOSIS — N18.32 CHRONIC KIDNEY DISEASE (CKD) STAGE G3B/A1, MODERATELY DECREASED GLOMERULAR FILTRATION RATE (GFR) BETWEEN 30-44 ML/MIN/1.73 SQUARE METER AND ALBUMINURIA CREATININE RATIO LESS THAN 30 MG/G: ICD-10-CM

## 2023-12-19 DIAGNOSIS — N17.9 AKI (ACUTE KIDNEY INJURY) (HCC): ICD-10-CM

## 2023-12-19 DIAGNOSIS — E55.9 VITAMIN D DEFICIENCY: ICD-10-CM

## 2023-12-19 DIAGNOSIS — I71.40 ABDOMINAL AORTIC ANEURYSM (AAA) WITHOUT RUPTURE, UNSPECIFIED PART (HCC): ICD-10-CM

## 2023-12-19 DIAGNOSIS — Z72.0 TOBACCO USE: ICD-10-CM

## 2023-12-19 DIAGNOSIS — E78.5 DYSLIPIDEMIA: ICD-10-CM

## 2023-12-19 PROCEDURE — 3078F DIAST BP <80 MM HG: CPT | Performed by: INTERNAL MEDICINE

## 2023-12-19 PROCEDURE — 99204 OFFICE O/P NEW MOD 45 MIN: CPT | Performed by: SURGERY

## 2023-12-19 PROCEDURE — 99203 OFFICE O/P NEW LOW 30 MIN: CPT | Performed by: INTERNAL MEDICINE

## 2023-12-19 PROCEDURE — 3074F SYST BP LT 130 MM HG: CPT | Performed by: INTERNAL MEDICINE

## 2023-12-19 PROCEDURE — 3074F SYST BP LT 130 MM HG: CPT | Performed by: SURGERY

## 2023-12-19 PROCEDURE — 3078F DIAST BP <80 MM HG: CPT | Performed by: SURGERY

## 2023-12-19 ASSESSMENT — FIBROSIS 4 INDEX
FIB4 SCORE: 1.34
FIB4 SCORE: 1.34

## 2023-12-19 ASSESSMENT — ENCOUNTER SYMPTOMS
SHORTNESS OF BREATH: 0
ABDOMINAL PAIN: 0
FEVER: 0

## 2023-12-19 NOTE — PROGRESS NOTES
"Chief Complaint   Patient presents with    New Patient     Stage 3b chronic kidney disease (HCC)       Requesting Provider: Zain Leung M.D.    HPI:  Adriana Floyd is a 83 y.o. female who presents today to establish nephrology care.  She is referred for elevated creatinine.    Re: elevated serum creatinine. She was told in the 1990's that she had some blood in her urine. She had \"a dye test\" that didn't reveal the source of bleeding. She still sees occasional hematuria \"once in a great while.\" She denies history of OZIEL. She tried to donate a kidney to her niece but was rejected due to microscopic hematuria. Had a kidney stone around 1993 that passed on its own. She had 4 normal pregnancies. She had a fall in late 2022, and took ibuprofen every 2nd or 3rd day. She does not exercise regularly. She denies bladder issues.       No past medical history on file.    Past Surgical History:   Procedure Laterality Date    ABDOMINAL HYSTERECTOMY TOTAL      SINUSCOPY          Outpatient Encounter Medications as of 12/19/2023   Medication Sig Dispense Refill    levothyroxine (SYNTHROID) 50 MCG Tab Take 1 Tablet by mouth every morning on an empty stomach. 90 Tablet 3    gabapentin (NEURONTIN) 100 MG Cap Take 1 Capsule by mouth 3 times a day. 270 Capsule 3    [DISCONTINUED] cephALEXin (KEFLEX) 500 MG Cap Take 1 Capsule by mouth 3 times a day. 21 Capsule 0     No facility-administered encounter medications on file as of 12/19/2023.        Allergies   Allergen Reactions    Oxycodone-Acetaminophen Vomiting       Social History     Socioeconomic History    Marital status:      Spouse name: Not on file    Number of children: Not on file    Years of education: Not on file    Highest education level: 12th grade   Occupational History    Not on file   Tobacco Use    Smoking status: Every Day     Current packs/day: 0.50     Average packs/day: 0.5 packs/day for 43.0 years (21.5 ttl pk-yrs)     Types: Cigarettes     Start " date: 1981    Smokeless tobacco: Never   Vaping Use    Vaping Use: Never used   Substance and Sexual Activity    Alcohol use: Not Currently    Drug use: Never    Sexual activity: Not Currently   Other Topics Concern    Not on file   Social History Narrative    Not on file     Social Determinants of Health     Financial Resource Strain: Low Risk  (7/11/2023)    Overall Financial Resource Strain (CARDIA)     Difficulty of Paying Living Expenses: Not hard at all   Food Insecurity: No Food Insecurity (7/11/2023)    Hunger Vital Sign     Worried About Running Out of Food in the Last Year: Never true     Ran Out of Food in the Last Year: Never true   Transportation Needs: No Transportation Needs (7/11/2023)    PRAPARE - Transportation     Lack of Transportation (Medical): No     Lack of Transportation (Non-Medical): No   Physical Activity: Inactive (7/11/2023)    Exercise Vital Sign     Days of Exercise per Week: 0 days     Minutes of Exercise per Session: 0 min   Stress: No Stress Concern Present (7/11/2023)    [employer] - Occupational Stress Questionnaire     Feeling of Stress : Not at all   Social Connections: Socially Isolated (7/11/2023)    Social Connection and Isolation Panel [NHANES]     Frequency of Communication with Friends and Family: More than three times a week     Frequency of Social Gatherings with Friends and Family: More than three times a week     Attends [employer] Services: Never     Active Member of Clubs or Organizations: No     Attends Club or Organization Meetings: Never     Marital Status:    Intimate Partner Violence: Not on file   Housing Stability: Unknown (7/11/2023)    Housing Stability Vital Sign     Unable to Pay for Housing in the Last Year: Not on file     Number of Places Lived in the Last Year: 1     Unstable Housing in the Last Year: No       Family History   Problem Relation Age of Onset    Stroke Mother     Diabetes Brother     Kidney Disease  "Other         Brother's wife and brother's daughter with ESRD    Lung Disease Neg Hx     Cancer Neg Hx     Hypertension Neg Hx        Review of Systems   Constitutional:  Negative for fever.   Respiratory:  Negative for shortness of breath.    Cardiovascular:  Negative for chest pain.   Gastrointestinal:  Negative for abdominal pain.   Genitourinary:  Negative for dysuria and hematuria.   All other systems reviewed and are negative.      /60 (BP Location: Right arm, Patient Position: Sitting)   Pulse 67   Temp 37.1 °C (98.7 °F) (Temporal)   Ht 1.6 m (5' 3\")   Wt 59.4 kg (131 lb)   SpO2 94%   BMI 23.21 kg/m²     Physical Exam  Constitutional:       General: She is not in acute distress.  HENT:      Mouth/Throat:      Pharynx: No oropharyngeal exudate.   Eyes:      General: No scleral icterus.  Neck:      Trachea: No tracheal deviation.   Cardiovascular:      Rate and Rhythm: Normal rate and regular rhythm.      Heart sounds: Normal heart sounds. No murmur heard.  Pulmonary:      Effort: Pulmonary effort is normal.      Breath sounds: Normal breath sounds. No stridor. No rales.   Abdominal:      General: Bowel sounds are normal.      Palpations: Abdomen is soft.      Tenderness: There is no abdominal tenderness.   Musculoskeletal:         General: Deformity (OA changes noted in hands) present. Normal range of motion.      Cervical back: Neck supple.      Right lower leg: No edema.      Left lower leg: No edema.   Skin:     General: Skin is warm and dry.      Findings: No rash.   Neurological:      General: No focal deficit present.      Mental Status: She is alert and oriented to person, place, and time.   Psychiatric:         Mood and Affect: Mood and affect normal.         Behavior: Behavior normal.         Labs reviewed.    Recent Labs     06/20/23  0952 09/13/23  1152   ALBUMIN 3.9  --    HDL 38* 42   TRIGLYCERIDE 132 118   SODIUM 142 139   POTASSIUM 4.1 4.5   CHLORIDE 109 108   CO2 19* 23   BUN 14 21 " "  CREATININE 1.18 1.40       Lab Results   Component Value Date/Time    WBC 7.4 09/13/2023 11:52 AM    RBC 4.80 09/13/2023 11:52 AM    HEMOGLOBIN 14.5 09/13/2023 11:52 AM    HEMATOCRIT 46.0 09/13/2023 11:52 AM    MCV 95.8 09/13/2023 11:52 AM    MCH 30.2 09/13/2023 11:52 AM    MCHC 31.5 (L) 09/13/2023 11:52 AM    MPV 10.1 09/13/2023 11:52 AM           URINALYSIS:  No results found for: \"COLORURINE\", \"CLARITY\", \"SPECGRAVITY\", \"PHURINE\", \"KETONES\", \"PROTEINURIN\", \"BILIRUBINUR\", \"UROBILU\", \"NITRITE\", \"LEUKESTERAS\", \"OCCULTBLOOD\"  UPC  No results found for: \"TOTPROTUR\" No results found for: \"CREATININEU\"      Outside labs notable for creatinine of 1.37 in October 2022    Imaging report(s) reviewed  No orders to display         Assessment:  Adriana Floyd is a 83 y.o. female who presents today to establish nephrology care.  She is referred for elevated creatinine.    Plan:  1. OZIEL (acute kidney injury) (HCC)  -I do not believe the patient is currently undergoing OZIEL, as her creatinine was essentially the same in October 2022.    2. Stage 3a chronic kidney disease (HCC)  - Underlying CKD likely from history of smoking, age-related kidney decline.  I recommend avoid NSAIDs and other nephrotoxins.  I recommend a low-salt diet.  I explained the importance of blood pressure control to help slow CKD progression.  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  I do recommend a workup with urinalysis, urine protein quantification.  Overall, I believe patient is at low risk of CKD progression to ESRD, but urine protein quantification will help stratify risk.        Return to clinic 1 year with pre-clinic labs, possibly sooner if kidney function does worsen in the interim.    James Zaman MD  Nephrology  [facility]    "

## 2023-12-19 NOTE — PROGRESS NOTES
VASCULAR SURGERY SERVICE  CONSULT NOTE      Date: 12/19/2023    Referring Provider: Zain Leung MD    Consulting Physician: Keren Leung MD - [facility]     -------------------------------------------------------------------------------------------------    Reason for consultation:  Abdominal aortic aneurysm.    HPI:  This is a 83 y.o. female who on recent renal ultrasound was found to have a 4.5 cm abdominal aortic aneurysm.  Patient denies any abdominal or back pain.  Patient is a longtime smoker, now down to half a pack a day.  Patient also have chronic kidney disease.    Past medical history: Chronic kidney disease, dyslipidemia, tobacco use.    Past Surgical History:   Procedure Laterality Date    ABDOMINAL HYSTERECTOMY TOTAL      SINUSCOPY         Current Outpatient Medications   Medication Sig Dispense Refill    levothyroxine (SYNTHROID) 50 MCG Tab Take 1 Tablet by mouth every morning on an empty stomach. 90 Tablet 3    cephALEXin (KEFLEX) 500 MG Cap Take 1 Capsule by mouth 3 times a day. (Patient not taking: Reported on 12/19/2023) 21 Capsule 0    gabapentin (NEURONTIN) 100 MG Cap Take 1 Capsule by mouth 3 times a day. (Patient not taking: Reported on 12/19/2023) 270 Capsule 3     No current facility-administered medications for this visit.       Social History     Socioeconomic History    Marital status:      Spouse name: Not on file    Number of children: Not on file    Years of education: Not on file    Highest education level: 12th grade   Occupational History    Not on file   Tobacco Use    Smoking status: Every Day     Current packs/day: 0.50     Types: Cigarettes    Smokeless tobacco: Never   Vaping Use    Vaping Use: Never used   Substance and Sexual Activity    Alcohol use: Not Currently    Drug use: Never    Sexual activity: Not Currently   Other Topics Concern    Not on file   Social History Narrative    Not on file     Social Determinants of Health     Financial Resource Strain:  Low Risk  (7/11/2023)    Overall Financial Resource Strain (CARDIA)     Difficulty of Paying Living Expenses: Not hard at all   Food Insecurity: No Food Insecurity (7/11/2023)    Hunger Vital Sign     Worried About Running Out of Food in the Last Year: Never true     Ran Out of Food in the Last Year: Never true   Transportation Needs: No Transportation Needs (7/11/2023)    PRAPARE - Transportation     Lack of Transportation (Medical): No     Lack of Transportation (Non-Medical): No   Physical Activity: Inactive (7/11/2023)    Exercise Vital Sign     Days of Exercise per Week: 0 days     Minutes of Exercise per Session: 0 min   Stress: No Stress Concern Present (7/11/2023)    [employer] [place] [employer] - Occupational Stress Questionnaire     Feeling of Stress : Not at all   Social Connections: Socially Isolated (7/11/2023)    Social Connection and Isolation Panel [NHANES]     Frequency of Communication with Friends and Family: More than three times a week     Frequency of Social Gatherings with Friends and Family: More than three times a week     Attends Islam Services: Never     Active Member of Clubs or Organizations: No     Attends Club or Organization Meetings: Never     Marital Status:    Intimate Partner Violence: Not on file   Housing Stability: Unknown (7/11/2023)    Housing Stability Vital Sign     Unable to Pay for Housing in the Last Year: Not on file     Number of Places Lived in the Last Year: 1     Unstable Housing in the Last Year: No       Family History   Problem Relation Age of Onset    Stroke Mother     Diabetes Brother     Lung Disease Neg Hx     Cancer Neg Hx     Hypertension Neg Hx        Allergies:  Oxycodone-acetaminophen    Review of Systems:    Constitutional: Negative for fever, chills, weight loss,   HENT:               Positive for hard of hearing   Eyes:    Negative for blurred vision, double vision, or loss of vision  Respiratory:  Negative for cough,  "hemoptysis, or wheezing    Cardiac:  Negative for chest pain or palpitations or orthopnea  Vascular:  Negative for claudication or rest pain   Gastrointestinal: Negative for nausea, vomiting, or abdominal pain     Negative for hematochezia or melena   Genitourinary: Negative for dysuria, frequency, or hematuria   Musculoskeletal: Negative for myalgias, back pain, or joint pain  Skin:   Negative for itching or rash  Neurological:  Negative for dizziness, headaches, or tremors     Negative for speech disturbance     Negative for extremity weakness or paresthesias  Endo/Heme:  Negative for easy bruising or bleeding  Psychiatric:  Negative for depression, suicidal ideas, or hallucinations    Physical Exam:  /66 (BP Location: Left arm, Patient Position: Sitting, BP Cuff Size: Adult)   Pulse 69   Temp 36.6 °C (97.9 °F) (Temporal)   Ht 1.6 m (5' 3\")   Wt 59 kg (130 lb)   SpO2 95%     Constitutional: Alert, oriented, no acute distress  HEENT:  Normocephalic and atraumatic, EOMI  Neck:   Supple, no JVD, no bruits.  Cardiovascular: Regular rate and rhythm  Pulmonary:  Good air entry bilaterally  Abdominal:  Soft, non-tender, non-distended     Pulsatile mass, nontender.    Musculoskeletal: No edema, no tenderness  Neurological:  CN II-XII grossly intact, no focal deficits  Skin:   Skin is warm and dry. No rash noted.  Psychiatric:  Normal mood and affect.  Upper extremities:     Palpable bilateral brachial pulses, symmetrical.  Distal radial pulses are not palpable on either side.  Hands are warm, well-perfused.    Lower extremities:     No pulsatile masses in femoral or popliteal areas.  Feet are warm, well-perfused.            Labs:  Reviewed.    Radiology:  Reviewed.    Assessment:  -Abdominal aortic aneurysm.  -Chronic kidney disease.  -Dyslipidemia.  -Tobacco use.    Plan:  I had a long discussion with patient and her sister.  Study results were reviewed with them.  I recommended that a noncontrast CT scan of " the abdomen and pelvis to be done to better determine the size of the aneurysm and plan for treatment.  They agreed.  I ordered the CT scan and asked patient to see me after the study is done.  She knows to be taken to the emergency room if she developed sudden onset of abdominal or back pain at any time.  All questions were answered.    Since there is a genetic component of aneurysm, I advised patient and her sister to have family members who are 60 years or older be screened for aneurysm with an ultrasound.  They indicated understanding.    I strongly counseled patient to stop smoking to prevent aneurysm growth and progression of disease.    Keren Leung MD  [facility]   [employer] preferred or call my office [phone]  __________________________________________________________________  Patient:Adriana Floyd   MRN:[id number]   [facility]:[id number]

## 2023-12-21 ENCOUNTER — HOSPITAL ENCOUNTER (OUTPATIENT)
Dept: RADIOLOGY | Facility: MEDICAL CENTER | Age: 83
End: 2023-12-21
Attending: SURGERY
Payer: MEDICARE

## 2023-12-21 DIAGNOSIS — I71.40 ABDOMINAL AORTIC ANEURYSM (AAA) WITHOUT RUPTURE, UNSPECIFIED PART (HCC): ICD-10-CM

## 2023-12-21 PROCEDURE — 74176 CT ABD & PELVIS W/O CONTRAST: CPT

## 2024-01-02 ENCOUNTER — OFFICE VISIT (OUTPATIENT)
Dept: VASCULAR SURGERY | Facility: MEDICAL CENTER | Age: 84
End: 2024-01-02
Payer: MEDICARE

## 2024-01-02 VITALS
HEART RATE: 66 BPM | BODY MASS INDEX: 23.58 KG/M2 | OXYGEN SATURATION: 97 % | HEIGHT: 63 IN | TEMPERATURE: 97.7 F | WEIGHT: 133.1 LBS | DIASTOLIC BLOOD PRESSURE: 68 MMHG | SYSTOLIC BLOOD PRESSURE: 114 MMHG

## 2024-01-02 DIAGNOSIS — N18.31 CHRONIC KIDNEY DISEASE (CKD) STAGE G3A/A1, MODERATELY DECREASED GLOMERULAR FILTRATION RATE (GFR) BETWEEN 45-59 ML/MIN/1.73 SQUARE METER AND ALBUMINURIA CREATININE RATIO LESS THAN 30 MG/G: ICD-10-CM

## 2024-01-02 DIAGNOSIS — Z72.0 TOBACCO USE: ICD-10-CM

## 2024-01-02 DIAGNOSIS — E78.5 DYSLIPIDEMIA: ICD-10-CM

## 2024-01-02 DIAGNOSIS — I71.40 ABDOMINAL AORTIC ANEURYSM (AAA) WITHOUT RUPTURE, UNSPECIFIED PART (HCC): ICD-10-CM

## 2024-01-02 DIAGNOSIS — I72.3 ILIAC ANEURYSM (HCC): ICD-10-CM

## 2024-01-02 PROCEDURE — 3078F DIAST BP <80 MM HG: CPT | Performed by: SURGERY

## 2024-01-02 PROCEDURE — 3074F SYST BP LT 130 MM HG: CPT | Performed by: SURGERY

## 2024-01-02 PROCEDURE — 99213 OFFICE O/P EST LOW 20 MIN: CPT | Performed by: SURGERY

## 2024-01-02 ASSESSMENT — FIBROSIS 4 INDEX: FIB4 SCORE: 1.34

## 2024-01-02 NOTE — PROGRESS NOTES
"  VASCULAR SURGERY SERVICE  OFFICE FOLLOW UP      Date: 1/2/2024    Referring Provider: Zain Leung MD    Consulting Physician: [facility] - [facility]     -------------------------------------------------------------------------------------------------    Chief complaint:  \"Here to go over my CT scan\".    HPI:  Ms. Floyd returns to the clinic today with her sister to go over her CT scan.  She has no complaint.  She denies abdominal or back pain.  She continues to smoke.    Recent noncontrast CT scan showed a 4.3 cm infrarenal abdominal aortic aneurysm, 2.5 cm right common iliac artery aneurysm, and 2.3 cm left common iliac artery aneurysm.    No past medical history on file.    Past Surgical History:   Procedure Laterality Date    ABDOMINAL HYSTERECTOMY TOTAL      SINUSCOPY         Current Outpatient Medications   Medication Sig Dispense Refill    levothyroxine (SYNTHROID) 50 MCG Tab Take 1 Tablet by mouth every morning on an empty stomach. 90 Tablet 3    gabapentin (NEURONTIN) 100 MG Cap Take 1 Capsule by mouth 3 times a day. 270 Capsule 3     No current facility-administered medications for this visit.       Social History     Socioeconomic History    Marital status:      Spouse name: Not on file    Number of children: Not on file    Years of education: Not on file    Highest education level: 12th grade   Occupational History    Not on file   Tobacco Use    Smoking status: Every Day     Current packs/day: 0.50     Average packs/day: 0.5 packs/day for 43.0 years (21.5 ttl pk-yrs)     Types: Cigarettes     Start date: 1981    Smokeless tobacco: Never   Vaping Use    Vaping Use: Never used   Substance and Sexual Activity    Alcohol use: Not Currently    Drug use: Never    Sexual activity: Not Currently   Other Topics Concern    Not on file   Social History Narrative    Not on file     Social Determinants of Health     Financial Resource Strain: Low Risk  (7/11/2023)    Overall Financial Resource " Strain (CARDIA)     Difficulty of Paying Living Expenses: Not hard at all   Food Insecurity: No Food Insecurity (7/11/2023)    Hunger Vital Sign     Worried About Running Out of Food in the Last Year: Never true     Ran Out of Food in the Last Year: Never true   Transportation Needs: No Transportation Needs (7/11/2023)    PRAPARE - Transportation     Lack of Transportation (Medical): No     Lack of Transportation (Non-Medical): No   Physical Activity: Inactive (7/11/2023)    Exercise Vital Sign     Days of Exercise per Week: 0 days     Minutes of Exercise per Session: 0 min   Stress: No Stress Concern Present (7/11/2023)    [employer] [place] [employer] - Occupational Stress Questionnaire     Feeling of Stress : Not at all   Social Connections: Socially Isolated (7/11/2023)    Social Connection and Isolation Panel [NHANES]     Frequency of Communication with Friends and Family: More than three times a week     Frequency of Social Gatherings with Friends and Family: More than three times a week     Attends Catholic Services: Never     Active Member of Clubs or Organizations: No     Attends Club or Organization Meetings: Never     Marital Status:    Intimate Partner Violence: Not on file   Housing Stability: Unknown (7/11/2023)    Housing Stability Vital Sign     Unable to Pay for Housing in the Last Year: Not on file     Number of Places Lived in the Last Year: 1     Unstable Housing in the Last Year: No       Family History   Problem Relation Age of Onset    Stroke Mother     Kidney Disease Sister         microscopic blood in urine    Diabetes Brother     Kidney Disease Brother         microscopic hematuria    Kidney Disease Other         Brother's wife and brother's daughter with ESRD    Lung Disease Neg Hx     Cancer Neg Hx     Hypertension Neg Hx        Allergies:  Oxycodone-acetaminophen    Review of Systems:    Constitutional: Negative for fever, chills, weight loss,   HENT:                "Positivie for hard of hearing    Eyes:    Negative for blurred vision, double vision, or loss of vision  Respiratory:  Negative for cough, hemoptysis, or wheezing    Cardiac:  Negative for chest pain or palpitations or orthopnea  Vascular:  Negative for claudication or rest pain   Gastrointestinal: Negative for nausea, vomiting, or abdominal pain     Negative for hematochezia or melena   Genitourinary: Negative for dysuria, frequency, or hematuria   Musculoskeletal: Negative for myalgias, back pain, or joint pain  Skin:   Negative for itching or rash  Neurological:  Negative for dizziness, headaches, or tremors     Negative for speech disturbance     Negative for extremity weakness or paresthesias  Endo/Heme:  Negative for easy bruising or bleeding  Psychiatric:  Negative for depression, suicidal ideas, or hallucinations    Physical Exam:  /68 (BP Location: Left arm, Patient Position: Sitting, BP Cuff Size: Adult)   Pulse 66   Temp 36.5 °C (97.7 °F) (Temporal)   Ht 1.6 m (5' 3\")   Wt 60.4 kg (133 lb 1.6 oz)   SpO2 97%     Constitutional:          Alert, oriented, no acute distress  HEENT:                       Normocephalic and atraumatic, EOMI  Neck:                          Supple, no JVD, no bruits.  Cardiovascular:         Regular rate and rhythm  Pulmonary:                Good air entry bilaterally  Abdominal:                Soft, non-tender, non-distended                                      Pulsatile mass, nontender.    Musculoskeletal:       No edema, no tenderness  Neurological:             CN II-XII grossly intact, no focal deficits  Skin:                           Skin is warm and dry. No rash noted.  Psychiatric:                Normal mood and affect.  Upper extremities:     Palpable bilateral brachial pulses, symmetrical.  Distal radial pulses are not palpable on either side.  Hands are warm, well-perfused.    Lower extremities:     No pulsatile masses in femoral or popliteal areas.  Feet are " warm, well-perfused.              Labs:  Reviewed.    Radiology:  Reviewed.    Assessment:  -4.3 cm infrarenal abdominal aortic aneurysm, asymptomatic.  -Small bilateral common iliac artery aneurysms, asymptomatic.  -Chronic kidney disease.  -Dyslipidemia.  -Tobacco use.    Plan:  I had a long discussion with patient and her sister.  CT images were reviewed with them.  Since the aneurysms are small and asymptomatic, no invasive intervention is indicated at this point.  I ordered follow-up aortoiliac duplex to be done in 6 months and asked patient to see me after the study is done.  Patient knows to be taken to the emergency room if she developed sudden onset of abdominal or back pain at any time.  I answered all of their questions.    I again counseled patient to stop smoking to prevent aneurysm grow and progression of disease.      Keren Leung MD  [facility]   [employer] preferred or call my office [phone]  __________________________________________________________________  Patient:Adriana Floyd   MRN:[id number]   CSN:[id number]

## 2024-01-24 ENCOUNTER — HOSPITAL ENCOUNTER (OUTPATIENT)
Dept: LAB | Facility: MEDICAL CENTER | Age: 84
End: 2024-01-24
Attending: INTERNAL MEDICINE
Payer: MEDICARE

## 2024-01-24 DIAGNOSIS — N17.9 AKI (ACUTE KIDNEY INJURY) (HCC): ICD-10-CM

## 2024-01-24 LAB
ANION GAP SERPL CALC-SCNC: 13 MMOL/L (ref 7–16)
APPEARANCE UR: CLEAR
BACTERIA #/AREA URNS HPF: ABNORMAL /HPF
BILIRUB UR QL STRIP.AUTO: NEGATIVE
BUN SERPL-MCNC: 19 MG/DL (ref 8–22)
CALCIUM SERPL-MCNC: 9.5 MG/DL (ref 8.5–10.5)
CHLORIDE SERPL-SCNC: 108 MMOL/L (ref 96–112)
CO2 SERPL-SCNC: 21 MMOL/L (ref 20–33)
COLOR UR: YELLOW
CREAT SERPL-MCNC: 1.09 MG/DL (ref 0.5–1.4)
CREAT UR-MCNC: 197.99 MG/DL
CREAT UR-MCNC: 216.39 MG/DL
EPI CELLS #/AREA URNS HPF: ABNORMAL /HPF
GFR SERPLBLD CREATININE-BSD FMLA CKD-EPI: 50 ML/MIN/1.73 M 2
GLUCOSE SERPL-MCNC: 92 MG/DL (ref 65–99)
GLUCOSE UR STRIP.AUTO-MCNC: NEGATIVE MG/DL
HYALINE CASTS #/AREA URNS LPF: ABNORMAL /LPF
KETONES UR STRIP.AUTO-MCNC: NEGATIVE MG/DL
LEUKOCYTE ESTERASE UR QL STRIP.AUTO: ABNORMAL
MICRO URNS: ABNORMAL
MICROALBUMIN UR-MCNC: 1.2 MG/DL
MICROALBUMIN/CREAT UR: 6 MG/G (ref 0–30)
NITRITE UR QL STRIP.AUTO: NEGATIVE
PH UR STRIP.AUTO: 5.5 [PH] (ref 5–8)
POTASSIUM SERPL-SCNC: 4.1 MMOL/L (ref 3.6–5.5)
PROT UR QL STRIP: NEGATIVE MG/DL
PROT UR-MCNC: 14 MG/DL (ref 0–15)
PROT/CREAT UR: 71 MG/G (ref 10–107)
RBC # URNS HPF: ABNORMAL /HPF
RBC UR QL AUTO: ABNORMAL
SODIUM SERPL-SCNC: 142 MMOL/L (ref 135–145)
SP GR UR STRIP.AUTO: 1.02
UROBILINOGEN UR STRIP.AUTO-MCNC: 0.2 MG/DL
WBC #/AREA URNS HPF: ABNORMAL /HPF

## 2024-01-24 PROCEDURE — 36415 COLL VENOUS BLD VENIPUNCTURE: CPT

## 2024-01-24 PROCEDURE — 82570 ASSAY OF URINE CREATININE: CPT | Mod: 91

## 2024-01-24 PROCEDURE — 84156 ASSAY OF PROTEIN URINE: CPT

## 2024-01-24 PROCEDURE — 81001 URINALYSIS AUTO W/SCOPE: CPT

## 2024-01-24 PROCEDURE — 80048 BASIC METABOLIC PNL TOTAL CA: CPT

## 2024-01-24 PROCEDURE — 82043 UR ALBUMIN QUANTITATIVE: CPT

## 2024-02-16 ENCOUNTER — PATIENT MESSAGE (OUTPATIENT)
Dept: HEALTH INFORMATION MANAGEMENT | Facility: OTHER | Age: 84
End: 2024-02-16

## 2024-08-23 ENCOUNTER — HOSPITAL ENCOUNTER (OUTPATIENT)
Dept: RADIOLOGY | Facility: MEDICAL CENTER | Age: 84
End: 2024-08-23
Attending: INTERNAL MEDICINE
Payer: MEDICARE

## 2024-08-23 ENCOUNTER — HOSPITAL ENCOUNTER (OUTPATIENT)
Dept: RADIOLOGY | Facility: MEDICAL CENTER | Age: 84
End: 2024-08-23
Attending: SURGERY
Payer: MEDICARE

## 2024-08-23 DIAGNOSIS — I71.40 ABDOMINAL AORTIC ANEURYSM (AAA) WITHOUT RUPTURE, UNSPECIFIED PART (HCC): ICD-10-CM

## 2024-08-23 DIAGNOSIS — I65.23 BILATERAL CAROTID ARTERY STENOSIS: ICD-10-CM

## 2024-08-23 DIAGNOSIS — I72.3 ILIAC ANEURYSM (HCC): ICD-10-CM

## 2024-08-23 PROCEDURE — 93978 VASCULAR STUDY: CPT

## 2024-08-23 PROCEDURE — 93880 EXTRACRANIAL BILAT STUDY: CPT

## 2024-08-24 DIAGNOSIS — I65.23 BILATERAL CAROTID ARTERY STENOSIS: ICD-10-CM

## 2024-08-26 PROBLEM — E78.5 DYSLIPIDEMIA: Chronic | Status: ACTIVE | Noted: 2023-02-14

## 2024-09-02 ENCOUNTER — APPOINTMENT (OUTPATIENT)
Dept: RADIOLOGY | Facility: MEDICAL CENTER | Age: 84
End: 2024-09-02
Attending: INTERNAL MEDICINE
Payer: MEDICARE

## 2024-09-03 ENCOUNTER — OFFICE VISIT (OUTPATIENT)
Dept: VASCULAR SURGERY | Facility: MEDICAL CENTER | Age: 84
End: 2024-09-03
Payer: MEDICARE

## 2024-09-03 VITALS
TEMPERATURE: 98.2 F | HEART RATE: 66 BPM | SYSTOLIC BLOOD PRESSURE: 96 MMHG | HEIGHT: 63 IN | BODY MASS INDEX: 23.57 KG/M2 | WEIGHT: 133 LBS | DIASTOLIC BLOOD PRESSURE: 58 MMHG | OXYGEN SATURATION: 95 %

## 2024-09-03 DIAGNOSIS — I72.3 ILIAC ARTERY ANEURYSM, BILATERAL (HCC): ICD-10-CM

## 2024-09-03 DIAGNOSIS — Z72.0 TOBACCO USE: ICD-10-CM

## 2024-09-03 DIAGNOSIS — I71.43 INFRARENAL ABDOMINAL AORTIC ANEURYSM (AAA) WITHOUT RUPTURE (HCC): ICD-10-CM

## 2024-09-03 PROCEDURE — 3078F DIAST BP <80 MM HG: CPT | Performed by: SURGERY

## 2024-09-03 PROCEDURE — 99214 OFFICE O/P EST MOD 30 MIN: CPT | Performed by: SURGERY

## 2024-09-03 PROCEDURE — 3074F SYST BP LT 130 MM HG: CPT | Performed by: SURGERY

## 2024-09-03 ASSESSMENT — FIBROSIS 4 INDEX: FIB4 SCORE: 1.36

## 2024-09-03 NOTE — PROGRESS NOTES
"  VASCULAR SURGERY SERVICE  OFFICE FOLLOW UP      Date: 9/3/2024    Referring Provider: Zain Leung MD     Consulting Physician: Keren Leung MD - [facility]      -------------------------------------------------------------------------------------------------     Chief complaint:  \"Here for follow-up of aneurysms\".     HPI:  Ms. Floyd returns to the clinic today with her sister for follow-up of her aneurysms.  She denies abdominal pain.  She has chronic lower back pain.  She continues to smoke.  She has been having problem with lightheadedness with ambulation.  Her primary care provider has been working her up for this.  Carotid duplex was negative for significant carotid artery stenosis and normal flow in bilateral vertebral arteries.    Recent aortoiliac duplex show 4.3 cm abdominal aortic aneurysm, 2.6 cm right common iliac artery aneurysm, and 2.5 cm left common iliac artery aneurysm, essentially unchanged compared to previous CT scan.       No past medical history on file.    Past Surgical History:   Procedure Laterality Date    ABDOMINAL HYSTERECTOMY TOTAL      SINUSCOPY         Current Outpatient Medications   Medication Sig Dispense Refill    levothyroxine (SYNTHROID) 50 MCG Tab Take 1 Tablet by mouth every morning on an empty stomach. 90 Tablet 3    gabapentin (NEURONTIN) 100 MG Cap Take 1 Capsule by mouth 3 times a day. 270 Capsule 3     No current facility-administered medications for this visit.       Social History     Socioeconomic History    Marital status:      Spouse name: Not on file    Number of children: Not on file    Years of education: Not on file    Highest education level: 12th grade   Occupational History    Not on file   Tobacco Use    Smoking status: Every Day     Current packs/day: 0.50     Average packs/day: 0.5 packs/day for 43.7 years (21.8 ttl pk-yrs)     Types: Cigarettes     Start date: 1981    Smokeless tobacco: Never   Vaping Use    Vaping status: Never Used "   Substance and Sexual Activity    Alcohol use: Not Currently    Drug use: Never    Sexual activity: Not Currently   Other Topics Concern    Not on file   Social History Narrative    Not on file     Social Determinants of Health     Financial Resource Strain: Low Risk  (7/11/2023)    Overall Financial Resource Strain (CARDIA)     Difficulty of Paying Living Expenses: Not hard at all   Food Insecurity: No Food Insecurity (7/11/2023)    Hunger Vital Sign     Worried About Running Out of Food in the Last Year: Never true     Ran Out of Food in the Last Year: Never true   Transportation Needs: No Transportation Needs (7/11/2023)    PRAPARE - Transportation     Lack of Transportation (Medical): No     Lack of Transportation (Non-Medical): No   Physical Activity: Inactive (7/11/2023)    Exercise Vital Sign     Days of Exercise per Week: 0 days     Minutes of Exercise per Session: 0 min   Stress: No Stress Concern Present (7/11/2023)    [employer] [place] [employer] - Occupational Stress Questionnaire     Feeling of Stress : Not at all   Social Connections: Socially Isolated (7/11/2023)    Social Connection and Isolation Panel [NHANES]     Frequency of Communication with Friends and Family: More than three times a week     Frequency of Social Gatherings with Friends and Family: More than three times a week     Attends Shinto Services: Never     Active Member of Clubs or Organizations: No     Attends Club or Organization Meetings: Never     Marital Status:    Intimate Partner Violence: Not on file   Housing Stability: Unknown (7/11/2023)    Housing Stability Vital Sign     Unable to Pay for Housing in the Last Year: Not on file     Number of Places Lived in the Last Year: 1     Unstable Housing in the Last Year: No       Family History   Problem Relation Age of Onset    Stroke Mother     Kidney Disease Sister         microscopic blood in urine    Diabetes Brother     Kidney Disease Brother          "microscopic hematuria    Kidney Disease Other         Brother's wife and brother's daughter with ESRD    Lung Disease Neg Hx     Cancer Neg Hx     Hypertension Neg Hx        Allergies:  Oxycodone-acetaminophen    Review of Systems:    Constitutional: Negative for fever, chills, weight loss,   HENT:    Positive for hard of hearing  Eyes:    Negative for blurred vision, double vision, or loss of vision  Respiratory:  Negative for cough, hemoptysis, or wheezing    Cardiac:  Negative for chest pain or palpitations or orthopnea  Vascular:  Negative for claudication or rest pain   Gastrointestinal: Negative for nausea, vomiting, or abdominal pain     Negative for hematochezia or melena   Genitourinary: Negative for dysuria, frequency, or hematuria   Musculoskeletal: CT for chronic lower back pain   Skin:   Negative for itching or rash  Neurological:  Positive for lightheadedness. Negative for speech disturbance     Negative for extremity weakness or paresthesias  Endo/Heme:  Negative for easy bruising or bleeding  Psychiatric:  Negative for depression, suicidal ideas, or hallucinations    Physical Exam:  BP 96/58 (BP Location: Left arm, Patient Position: Sitting, BP Cuff Size: Adult)   Pulse 66   Temp 36.8 °C (98.2 °F) (Temporal)   Ht 1.6 m (5' 3\")   Wt 60.3 kg (133 lb)   SpO2 95%     Constitutional:          Alert, oriented, no acute distress  HEENT:                       Normocephalic and atraumatic, EOMI  Neck:                          Supple, no JVD, no bruits.  Cardiovascular:         Regular rate and rhythm  Pulmonary:                Good air entry bilaterally  Abdominal:                Soft, non-tender, non-distended                                      Pulsatile mass, nontender.    Musculoskeletal:       No edema, no tenderness  Neurological:             CN II-XII grossly intact, no focal deficits  Skin:                           Skin is warm and dry. No rash noted.  Psychiatric:                Normal mood " and affect.  Upper extremities:     Palpable bilateral brachial pulses, symmetrical.  Distal radial pulses are not palpable on either side.  Hands are warm, well-perfused.    Lower extremities:     No pulsatile masses in femoral or popliteal areas.  Feet are warm, well-perfused.               Labs:  Reviewed.     Radiology:  Reviewed.     Assessment:  -4.3 cm infrarenal abdominal aortic aneurysm, asymptomatic.  -Small bilateral common iliac artery aneurysms, asymptomatic.  -Chronic kidney disease.  -Dyslipidemia.  -Tobacco use.     Plan:  I had a long discussion with patient and her sister.  Aortoiliac duplex results were reviewed with them.  Since the aneurysms are small and asymptomatic, no invasive intervention is indicated at this point.  I ordered follow-up aortoiliac duplex to be done in 6 months and asked patient to see me after the study is done.  Patient knows to be taken to the emergency room if she developed sudden onset of abdominal or new back pain at any time.  I answered all of their questions.    I counseled patient to stop smoking.       Keren Leung MD  [facility]   [employer] preferred or call my office [phone]  __________________________________________________________________  Patient:Adriana Floyd   MRN:[id number]   CSN:[id number]

## 2024-10-07 ENCOUNTER — HOSPITAL ENCOUNTER (OUTPATIENT)
Dept: LAB | Facility: MEDICAL CENTER | Age: 84
End: 2024-10-07
Attending: INTERNAL MEDICINE
Payer: MEDICARE

## 2024-10-07 ENCOUNTER — APPOINTMENT (OUTPATIENT)
Dept: MEDICAL GROUP | Facility: PHYSICIAN GROUP | Age: 84
End: 2024-10-07
Payer: MEDICARE

## 2024-10-07 VITALS
HEIGHT: 63 IN | RESPIRATION RATE: 12 BRPM | BODY MASS INDEX: 24.26 KG/M2 | TEMPERATURE: 97.8 F | WEIGHT: 136.9 LBS | SYSTOLIC BLOOD PRESSURE: 90 MMHG | DIASTOLIC BLOOD PRESSURE: 58 MMHG | HEART RATE: 65 BPM | OXYGEN SATURATION: 97 %

## 2024-10-07 DIAGNOSIS — N18.31 STAGE 3A CHRONIC KIDNEY DISEASE: Chronic | ICD-10-CM

## 2024-10-07 DIAGNOSIS — R42 LIGHTHEADEDNESS: ICD-10-CM

## 2024-10-07 DIAGNOSIS — E55.9 VITAMIN D DEFICIENCY: ICD-10-CM

## 2024-10-07 DIAGNOSIS — R53.82 CHRONIC FATIGUE: ICD-10-CM

## 2024-10-07 DIAGNOSIS — I71.43 INFRARENAL ABDOMINAL AORTIC ANEURYSM (AAA) WITHOUT RUPTURE (HCC): ICD-10-CM

## 2024-10-07 DIAGNOSIS — G44.321 INTRACTABLE CHRONIC POST-TRAUMATIC HEADACHE: ICD-10-CM

## 2024-10-07 DIAGNOSIS — E03.9 ACQUIRED HYPOTHYROIDISM: Chronic | ICD-10-CM

## 2024-10-07 DIAGNOSIS — I72.3 ANEURYSM, COMMON ILIAC ARTERY (HCC): ICD-10-CM

## 2024-10-07 DIAGNOSIS — S09.90XD INJURY OF HEAD, SUBSEQUENT ENCOUNTER: ICD-10-CM

## 2024-10-07 DIAGNOSIS — R30.0 DYSURIA: ICD-10-CM

## 2024-10-07 DIAGNOSIS — F17.210 CIGARETTE NICOTINE DEPENDENCE WITHOUT COMPLICATION: Chronic | ICD-10-CM

## 2024-10-07 DIAGNOSIS — I65.23 BILATERAL CAROTID ARTERY STENOSIS: ICD-10-CM

## 2024-10-07 DIAGNOSIS — E78.5 DYSLIPIDEMIA: Chronic | ICD-10-CM

## 2024-10-07 PROBLEM — R55 NEAR SYNCOPE: Chronic | Status: RESOLVED | Noted: 2023-08-31 | Resolved: 2024-10-07

## 2024-10-07 LAB
BASOPHILS # BLD AUTO: 1 % (ref 0–1.8)
BASOPHILS # BLD: 0.06 K/UL (ref 0–0.12)
EOSINOPHIL # BLD AUTO: 0.41 K/UL (ref 0–0.51)
EOSINOPHIL NFR BLD: 6.8 % (ref 0–6.9)
ERYTHROCYTE [DISTWIDTH] IN BLOOD BY AUTOMATED COUNT: 49.1 FL (ref 35.9–50)
HCT VFR BLD AUTO: 45.7 % (ref 37–47)
HGB BLD-MCNC: 14.1 G/DL (ref 12–16)
IMM GRANULOCYTES # BLD AUTO: 0.01 K/UL (ref 0–0.11)
IMM GRANULOCYTES NFR BLD AUTO: 0.2 % (ref 0–0.9)
LYMPHOCYTES # BLD AUTO: 1.35 K/UL (ref 1–4.8)
LYMPHOCYTES NFR BLD: 22.5 % (ref 22–41)
MCH RBC QN AUTO: 29.8 PG (ref 27–33)
MCHC RBC AUTO-ENTMCNC: 30.9 G/DL (ref 32.2–35.5)
MCV RBC AUTO: 96.6 FL (ref 81.4–97.8)
MONOCYTES # BLD AUTO: 0.51 K/UL (ref 0–0.85)
MONOCYTES NFR BLD AUTO: 8.5 % (ref 0–13.4)
NEUTROPHILS # BLD AUTO: 3.67 K/UL (ref 1.82–7.42)
NEUTROPHILS NFR BLD: 61 % (ref 44–72)
NRBC # BLD AUTO: 0 K/UL
NRBC BLD-RTO: 0 /100 WBC (ref 0–0.2)
PLATELET # BLD AUTO: 204 K/UL (ref 164–446)
PMV BLD AUTO: 10.1 FL (ref 9–12.9)
RBC # BLD AUTO: 4.73 M/UL (ref 4.2–5.4)
WBC # BLD AUTO: 6 K/UL (ref 4.8–10.8)

## 2024-10-07 PROCEDURE — 80048 BASIC METABOLIC PNL TOTAL CA: CPT

## 2024-10-07 PROCEDURE — 84443 ASSAY THYROID STIM HORMONE: CPT

## 2024-10-07 PROCEDURE — 3074F SYST BP LT 130 MM HG: CPT | Performed by: INTERNAL MEDICINE

## 2024-10-07 PROCEDURE — 85025 COMPLETE CBC W/AUTO DIFF WBC: CPT

## 2024-10-07 PROCEDURE — 36415 COLL VENOUS BLD VENIPUNCTURE: CPT

## 2024-10-07 PROCEDURE — 3078F DIAST BP <80 MM HG: CPT | Performed by: INTERNAL MEDICINE

## 2024-10-07 PROCEDURE — 82306 VITAMIN D 25 HYDROXY: CPT

## 2024-10-07 PROCEDURE — 99215 OFFICE O/P EST HI 40 MIN: CPT | Performed by: INTERNAL MEDICINE

## 2024-10-07 PROCEDURE — 80061 LIPID PANEL: CPT

## 2024-10-07 ASSESSMENT — PATIENT HEALTH QUESTIONNAIRE - PHQ9: CLINICAL INTERPRETATION OF PHQ2 SCORE: 0

## 2024-10-07 ASSESSMENT — FIBROSIS 4 INDEX: FIB4 SCORE: 1.36

## 2024-10-08 ENCOUNTER — HOSPITAL ENCOUNTER (OUTPATIENT)
Facility: MEDICAL CENTER | Age: 84
End: 2024-10-08
Attending: INTERNAL MEDICINE
Payer: MEDICARE

## 2024-10-08 DIAGNOSIS — E55.9 VITAMIN D DEFICIENCY: ICD-10-CM

## 2024-10-08 DIAGNOSIS — N18.32 STAGE 3B CHRONIC KIDNEY DISEASE: Chronic | ICD-10-CM

## 2024-10-08 LAB
25(OH)D3 SERPL-MCNC: 10 NG/ML (ref 30–100)
ANION GAP SERPL CALC-SCNC: 16 MMOL/L (ref 7–16)
APPEARANCE UR: CLEAR
BACTERIA #/AREA URNS HPF: NEGATIVE /HPF
BILIRUB UR QL STRIP.AUTO: NEGATIVE
BUN SERPL-MCNC: 26 MG/DL (ref 8–22)
CALCIUM SERPL-MCNC: 10.6 MG/DL (ref 8.5–10.5)
CHLORIDE SERPL-SCNC: 106 MMOL/L (ref 96–112)
CHOLEST SERPL-MCNC: 182 MG/DL (ref 100–199)
CO2 SERPL-SCNC: 21 MMOL/L (ref 20–33)
COLOR UR: YELLOW
CREAT SERPL-MCNC: 1.48 MG/DL (ref 0.5–1.4)
CREAT UR-MCNC: 63 MG/DL
EPI CELLS #/AREA URNS HPF: NEGATIVE /HPF
GFR SERPLBLD CREATININE-BSD FMLA CKD-EPI: 35 ML/MIN/1.73 M 2
GLUCOSE SERPL-MCNC: 87 MG/DL (ref 65–99)
GLUCOSE UR STRIP.AUTO-MCNC: NEGATIVE MG/DL
HDLC SERPL-MCNC: 40 MG/DL
HYALINE CASTS #/AREA URNS LPF: NORMAL /LPF
KETONES UR STRIP.AUTO-MCNC: NEGATIVE MG/DL
LDLC SERPL CALC-MCNC: 116 MG/DL
LEUKOCYTE ESTERASE UR QL STRIP.AUTO: ABNORMAL
MICRO URNS: ABNORMAL
MICROALBUMIN UR-MCNC: <1.2 MG/DL
MICROALBUMIN/CREAT UR: NORMAL MG/G (ref 0–30)
NITRITE UR QL STRIP.AUTO: NEGATIVE
PH UR STRIP.AUTO: 6 [PH] (ref 5–8)
POTASSIUM SERPL-SCNC: 4.7 MMOL/L (ref 3.6–5.5)
PROT UR QL STRIP: NEGATIVE MG/DL
RBC # URNS HPF: NORMAL /HPF
RBC UR QL AUTO: NEGATIVE
SODIUM SERPL-SCNC: 143 MMOL/L (ref 135–145)
SP GR UR STRIP.AUTO: 1.01
TRIGL SERPL-MCNC: 132 MG/DL (ref 0–149)
TSH SERPL-ACNC: 0.89 UIU/ML (ref 0.35–5.5)
UROBILINOGEN UR STRIP.AUTO-MCNC: 1 MG/DL
WBC #/AREA URNS HPF: NORMAL /HPF

## 2024-10-08 PROCEDURE — 82043 UR ALBUMIN QUANTITATIVE: CPT

## 2024-10-08 PROCEDURE — 81001 URINALYSIS AUTO W/SCOPE: CPT

## 2024-10-08 PROCEDURE — 87086 URINE CULTURE/COLONY COUNT: CPT

## 2024-10-08 PROCEDURE — 82570 ASSAY OF URINE CREATININE: CPT

## 2024-10-09 DIAGNOSIS — N18.31 STAGE 3A CHRONIC KIDNEY DISEASE: Chronic | ICD-10-CM

## 2024-10-10 LAB
BACTERIA UR CULT: NORMAL
SIGNIFICANT IND 70042: NORMAL
SITE SITE: NORMAL
SOURCE SOURCE: NORMAL

## 2024-10-10 RX ORDER — LEVOTHYROXINE SODIUM 50 UG/1
50 TABLET ORAL
Qty: 90 TABLET | Refills: 3 | Status: SHIPPED | OUTPATIENT
Start: 2024-10-10

## 2024-10-16 ENCOUNTER — HOSPITAL ENCOUNTER (OUTPATIENT)
Dept: RADIOLOGY | Facility: MEDICAL CENTER | Age: 84
End: 2024-10-16
Attending: INTERNAL MEDICINE
Payer: MEDICARE

## 2024-10-16 DIAGNOSIS — S09.90XD INJURY OF HEAD, SUBSEQUENT ENCOUNTER: ICD-10-CM

## 2024-10-16 PROCEDURE — 70450 CT HEAD/BRAIN W/O DYE: CPT

## 2024-10-24 DIAGNOSIS — G50.0 TIC DOULOUREUX: ICD-10-CM

## 2024-10-24 RX ORDER — GABAPENTIN 100 MG/1
100 CAPSULE ORAL 3 TIMES DAILY
Qty: 270 CAPSULE | Refills: 0 | Status: SHIPPED | OUTPATIENT
Start: 2024-10-24

## 2024-11-15 ENCOUNTER — OFFICE VISIT (OUTPATIENT)
Dept: MEDICAL GROUP | Facility: PHYSICIAN GROUP | Age: 84
End: 2024-11-15
Payer: MEDICARE

## 2024-11-15 VITALS
WEIGHT: 134.9 LBS | HEART RATE: 64 BPM | SYSTOLIC BLOOD PRESSURE: 120 MMHG | OXYGEN SATURATION: 96 % | HEIGHT: 63 IN | BODY MASS INDEX: 23.9 KG/M2 | DIASTOLIC BLOOD PRESSURE: 78 MMHG | RESPIRATION RATE: 16 BRPM | TEMPERATURE: 97.4 F

## 2024-11-15 DIAGNOSIS — H61.23 BILATERAL IMPACTED CERUMEN: ICD-10-CM

## 2024-11-15 PROCEDURE — 99213 OFFICE O/P EST LOW 20 MIN: CPT | Performed by: PHYSICIAN ASSISTANT

## 2024-11-15 PROCEDURE — 3078F DIAST BP <80 MM HG: CPT | Performed by: PHYSICIAN ASSISTANT

## 2024-11-15 PROCEDURE — 3074F SYST BP LT 130 MM HG: CPT | Performed by: PHYSICIAN ASSISTANT

## 2024-11-15 ASSESSMENT — ENCOUNTER SYMPTOMS
CHILLS: 0
FEVER: 0
SHORTNESS OF BREATH: 0

## 2024-11-15 ASSESSMENT — FIBROSIS 4 INDEX: FIB4 SCORE: 1.36

## 2024-11-15 NOTE — ASSESSMENT & PLAN NOTE
Chronic, uncontrolled.  Having difficulty hearing.    Checked with [employer] for hearing aids and they told her her ears were plugged.

## 2024-11-15 NOTE — PROGRESS NOTES
"SUBJECTIVE:     CC:     HPI:   Adriana, patient of Dr. Leung, presents today for an acute visit with the following:    ASSESSMENT & PLAN by Problem:     Problem   Bilateral Impacted Cerumen    Resolved.  MA was able to flush out all of the cerumen.  TMs and EACs are clear.  Follow-up as needed.           Return if symptoms worsen or fail to improve.        HPI:     Problem List Items Addressed This Visit       Bilateral impacted cerumen     Chronic, uncontrolled.  Having difficulty hearing.    Checked with [employer] for hearing aids and they told her her ears were plugged.                   ROS:  Review of Systems   Constitutional:  Negative for chills and fever.   Respiratory:  Negative for shortness of breath.    Cardiovascular:  Negative for chest pain.       OBJECTIVE:     Exam:  /78 (BP Location: Left arm, Patient Position: Sitting, BP Cuff Size: Adult)   Pulse 64   Temp 36.3 °C (97.4 °F) (Temporal)   Resp 16   Ht 1.6 m (5' 3\")   Wt 61.2 kg (134 lb 14.4 oz)   SpO2 96%   BMI 23.90 kg/m²  Body mass index is 23.9 kg/m².    Physical Exam  Vitals reviewed.   Constitutional:       General: She is not in acute distress.     Appearance: Normal appearance.   HENT:      Right Ear: There is impacted cerumen.      Left Ear: There is impacted cerumen.   Pulmonary:      Effort: Pulmonary effort is normal.   Neurological:      General: No focal deficit present.      Mental Status: She is alert.   Psychiatric:         Mood and Affect: Mood normal.         Behavior: Behavior normal.         Judgment: Judgment normal.                   Please note that this dictation was created using voice recognition software. I have made every reasonable attempt to correct obvious errors, but I expect that there are errors of grammar and possibly content that I did not discover before finalizing the note.    "

## 2024-12-05 ENCOUNTER — HOSPITAL ENCOUNTER (OUTPATIENT)
Dept: LAB | Facility: MEDICAL CENTER | Age: 84
End: 2024-12-05
Attending: INTERNAL MEDICINE
Payer: MEDICARE

## 2024-12-05 DIAGNOSIS — E55.9 VITAMIN D DEFICIENCY: ICD-10-CM

## 2024-12-05 DIAGNOSIS — N18.31 STAGE 3A CHRONIC KIDNEY DISEASE: Chronic | ICD-10-CM

## 2024-12-05 DIAGNOSIS — N17.9 AKI (ACUTE KIDNEY INJURY) (HCC): ICD-10-CM

## 2024-12-05 DIAGNOSIS — Z87.440 HISTORY OF UTI: ICD-10-CM

## 2024-12-05 LAB
ERYTHROCYTE [DISTWIDTH] IN BLOOD BY AUTOMATED COUNT: 48.8 FL (ref 35.9–50)
HCT VFR BLD AUTO: 43.6 % (ref 37–47)
HGB BLD-MCNC: 14.3 G/DL (ref 12–16)
MCH RBC QN AUTO: 31.2 PG (ref 27–33)
MCHC RBC AUTO-ENTMCNC: 32.8 G/DL (ref 32.2–35.5)
MCV RBC AUTO: 95.2 FL (ref 81.4–97.8)
PLATELET # BLD AUTO: 206 K/UL (ref 164–446)
PMV BLD AUTO: 9.8 FL (ref 9–12.9)
RBC # BLD AUTO: 4.58 M/UL (ref 4.2–5.4)
WBC # BLD AUTO: 7.3 K/UL (ref 4.8–10.8)

## 2024-12-05 PROCEDURE — 36415 COLL VENOUS BLD VENIPUNCTURE: CPT

## 2024-12-05 PROCEDURE — 83970 ASSAY OF PARATHORMONE: CPT

## 2024-12-05 PROCEDURE — 82040 ASSAY OF SERUM ALBUMIN: CPT

## 2024-12-05 PROCEDURE — 82306 VITAMIN D 25 HYDROXY: CPT

## 2024-12-05 PROCEDURE — 80048 BASIC METABOLIC PNL TOTAL CA: CPT

## 2024-12-05 PROCEDURE — 84100 ASSAY OF PHOSPHORUS: CPT

## 2024-12-05 PROCEDURE — 85027 COMPLETE CBC AUTOMATED: CPT

## 2024-12-06 ENCOUNTER — HOSPITAL ENCOUNTER (OUTPATIENT)
Facility: MEDICAL CENTER | Age: 84
End: 2024-12-06
Attending: INTERNAL MEDICINE
Payer: MEDICARE

## 2024-12-06 LAB
25(OH)D3 SERPL-MCNC: 27 NG/ML (ref 30–100)
ALBUMIN SERPL BCP-MCNC: 4.3 G/DL (ref 3.2–4.9)
ANION GAP SERPL CALC-SCNC: 10 MMOL/L (ref 7–16)
APPEARANCE UR: CLEAR
BILIRUB UR QL STRIP.AUTO: NEGATIVE
BUN SERPL-MCNC: 20 MG/DL (ref 8–22)
CALCIUM SERPL-MCNC: 9.7 MG/DL (ref 8.5–10.5)
CHLORIDE SERPL-SCNC: 107 MMOL/L (ref 96–112)
CO2 SERPL-SCNC: 22 MMOL/L (ref 20–33)
COLOR UR: YELLOW
CREAT SERPL-MCNC: 1.08 MG/DL (ref 0.5–1.4)
GFR SERPLBLD CREATININE-BSD FMLA CKD-EPI: 51 ML/MIN/1.73 M 2
GLUCOSE SERPL-MCNC: 87 MG/DL (ref 65–99)
GLUCOSE UR STRIP.AUTO-MCNC: NEGATIVE MG/DL
KETONES UR STRIP.AUTO-MCNC: NEGATIVE MG/DL
LEUKOCYTE ESTERASE UR QL STRIP.AUTO: NEGATIVE
MICRO URNS: NORMAL
NITRITE UR QL STRIP.AUTO: NEGATIVE
PH UR STRIP.AUTO: 6.5 [PH] (ref 5–8)
PHOSPHATE SERPL-MCNC: 2.7 MG/DL (ref 2.5–4.5)
POTASSIUM SERPL-SCNC: 3.9 MMOL/L (ref 3.6–5.5)
PROT UR QL STRIP: NEGATIVE MG/DL
PTH-INTACT SERPL-MCNC: 54.9 PG/ML (ref 14–72)
RBC UR QL AUTO: NEGATIVE
SODIUM SERPL-SCNC: 139 MMOL/L (ref 135–145)
SP GR UR STRIP.AUTO: 1.01
UROBILINOGEN UR STRIP.AUTO-MCNC: 1 EU/DL

## 2024-12-06 PROCEDURE — 82570 ASSAY OF URINE CREATININE: CPT

## 2024-12-06 PROCEDURE — 82043 UR ALBUMIN QUANTITATIVE: CPT

## 2024-12-06 PROCEDURE — 81003 URINALYSIS AUTO W/O SCOPE: CPT

## 2024-12-06 PROCEDURE — 84156 ASSAY OF PROTEIN URINE: CPT

## 2024-12-08 LAB
CREAT UR-MCNC: 27.73 MG/DL
CREAT UR-MCNC: 30.3 MG/DL
MICROALBUMIN UR-MCNC: <1.2 MG/DL
MICROALBUMIN/CREAT UR: NORMAL MG/G (ref 0–30)
PROT UR-MCNC: <6 MG/DL (ref 0–15)
PROT/CREAT UR: 198 MG/G (ref 10–107)

## 2024-12-13 ENCOUNTER — OFFICE VISIT (OUTPATIENT)
Dept: NEPHROLOGY | Facility: MEDICAL CENTER | Age: 84
End: 2024-12-13
Payer: MEDICARE

## 2024-12-13 VITALS
WEIGHT: 132 LBS | TEMPERATURE: 98.3 F | OXYGEN SATURATION: 97 % | SYSTOLIC BLOOD PRESSURE: 104 MMHG | BODY MASS INDEX: 23.39 KG/M2 | HEIGHT: 63 IN | HEART RATE: 65 BPM | DIASTOLIC BLOOD PRESSURE: 62 MMHG

## 2024-12-13 DIAGNOSIS — E55.9 VITAMIN D DEFICIENCY: ICD-10-CM

## 2024-12-13 DIAGNOSIS — I71.43 INFRARENAL ABDOMINAL AORTIC ANEURYSM (AAA) WITHOUT RUPTURE (HCC): Chronic | ICD-10-CM

## 2024-12-13 DIAGNOSIS — N18.31 STAGE 3A CHRONIC KIDNEY DISEASE: ICD-10-CM

## 2024-12-13 PROCEDURE — 3078F DIAST BP <80 MM HG: CPT | Performed by: INTERNAL MEDICINE

## 2024-12-13 PROCEDURE — 99213 OFFICE O/P EST LOW 20 MIN: CPT | Performed by: INTERNAL MEDICINE

## 2024-12-13 PROCEDURE — 3074F SYST BP LT 130 MM HG: CPT | Performed by: INTERNAL MEDICINE

## 2024-12-13 RX ORDER — ERGOCALCIFEROL 1.25 MG/1
50000 CAPSULE, LIQUID FILLED ORAL
Qty: 12 CAPSULE | Refills: 0 | Status: SHIPPED | OUTPATIENT
Start: 2024-12-13

## 2024-12-13 ASSESSMENT — ENCOUNTER SYMPTOMS
BACK PAIN: 1
FEVER: 0
SHORTNESS OF BREATH: 0
ABDOMINAL PAIN: 0

## 2024-12-13 ASSESSMENT — FIBROSIS 4 INDEX: FIB4 SCORE: 1.34

## 2024-12-13 NOTE — PROGRESS NOTES
"Chief Complaint   Patient presents with    Chronic Kidney Disease       CC: follow up CKD    HPI:  Adriana Floyd is a 84 y.o. female who presents today for follow up nephrology care.      Re: elevated serum creatinine. She was told in the 1990's that she had some blood in her urine. She had \"a dye test\" that didn't reveal the source of bleeding.  She denies history of OZIEL. She tried to donate a kidney to her niece but was rejected due to microscopic hematuria. Had a kidney stone around 1993 that passed on its own. She had 4 normal pregnancies. She had a fall in late 2022, and took ibuprofen every 2nd or 3rd day. She still takes 400mg of ibuprofen every 3 days for back pain. She does not exercise regularly.  She denies bladder issues. Appetite is ok. She is drinking nearly 2L of water per day. Denies Light-headedness.       No past medical history on file.    Past Surgical History:   Procedure Laterality Date    ABDOMINAL HYSTERECTOMY TOTAL      SINUSCOPY          Outpatient Encounter Medications as of 12/13/2024   Medication Sig Dispense Refill    gabapentin (NEURONTIN) 100 MG Cap TAKE 1 CAPSULE BY MOUTH THREE TIMES DAILY 270 Capsule 0    levothyroxine (SYNTHROID) 50 MCG Tab TAKE 1 TABLET BY MOUTH EVERY MORNING ON AN EMPTY STOMACH 90 Tablet 3     No facility-administered encounter medications on file as of 12/13/2024.        Allergies   Allergen Reactions    Oxycodone-Acetaminophen Vomiting             Family History   Problem Relation Age of Onset    Stroke Mother     Kidney Disease Sister         microscopic blood in urine    Diabetes Brother     Kidney Disease Brother         microscopic hematuria    Kidney Disease Other         Brother's wife and brother's daughter with ESRD    Lung Disease Neg Hx     Cancer Neg Hx     Hypertension Neg Hx        Review of Systems   Constitutional:  Negative for fever.   Respiratory:  Negative for shortness of breath.    Cardiovascular:  Negative for chest pain. " "  Gastrointestinal:  Negative for abdominal pain.   Genitourinary:  Negative for dysuria and hematuria.   Musculoskeletal:  Positive for back pain.   All other systems reviewed and are negative.      /62 (BP Location: Right arm, Patient Position: Sitting, BP Cuff Size: Adult)   Pulse 65   Temp 36.8 °C (98.3 °F) (Temporal)   Ht 1.6 m (5' 3\")   Wt 59.9 kg (132 lb)   SpO2 97%   BMI 23.38 kg/m²     Physical Exam  Constitutional:       General: She is not in acute distress.  HENT:      Mouth/Throat:      Pharynx: No oropharyngeal exudate.   Eyes:      General: No scleral icterus.  Neck:      Trachea: No tracheal deviation.   Cardiovascular:      Rate and Rhythm: Normal rate and regular rhythm.      Heart sounds: Normal heart sounds. No murmur heard.  Pulmonary:      Effort: Pulmonary effort is normal.      Breath sounds: Normal breath sounds. No stridor. No rales.   Abdominal:      General: Bowel sounds are normal.      Palpations: Abdomen is soft.      Tenderness: There is no abdominal tenderness.   Musculoskeletal:         General: Normal range of motion.      Cervical back: Neck supple.      Right lower leg: No edema.      Left lower leg: No edema.   Skin:     General: Skin is warm and dry.      Findings: No rash.   Neurological:      General: No focal deficit present.      Mental Status: She is alert and oriented to person, place, and time.   Psychiatric:         Mood and Affect: Mood and affect normal.         Behavior: Behavior normal.         Labs reviewed.    Recent Labs     01/24/24  1152 10/07/24  1130 12/05/24  1520   ALBUMIN  --   --  4.3   HDL  --  40  --    TRIGLYCERIDE  --  132  --    SODIUM 142 143 139   POTASSIUM 4.1 4.7 3.9   CHLORIDE 108 106 107   CO2 21 21 22   BUN 19 26* 20   CREATININE 1.09 1.48* 1.08   PHOSPHORUS  --   --  2.7       Lab Results   Component Value Date/Time    WBC 7.3 12/05/2024 03:20 PM    RBC 4.58 12/05/2024 03:20 PM    HEMOGLOBIN 14.3 12/05/2024 03:20 PM    HEMATOCRIT " 43.6 12/05/2024 03:20 PM    MCV 95.2 12/05/2024 03:20 PM    MCH 31.2 12/05/2024 03:20 PM    MCHC 32.8 12/05/2024 03:20 PM    MPV 9.8 12/05/2024 03:20 PM             URINALYSIS:  Lab Results   Component Value Date/Time    COLORURINE Yellow 12/06/2024 1124    CLARITY Clear 12/06/2024 1124    SPECGRAVITY 1.006 12/06/2024 1124    PHURINE 6.5 12/06/2024 1124    KETONES Negative 12/06/2024 1124    PROTEINURIN Negative 12/06/2024 1124    BILIRUBINUR Negative 12/06/2024 1124    UROBILU 1.0 12/06/2024 1124    NITRITE Negative 12/06/2024 1124    LEUKESTERAS Negative 12/06/2024 1124    OCCULTBLOOD Negative 12/06/2024 1124       UPC  Lab Results   Component Value Date/Time    TOTPROTUR <6.0 12/06/2024 1124      Lab Results   Component Value Date/Time    CREATININEU 30.30 12/06/2024 1124         Outside labs notable for creatinine of 1.37 in October 2022    Imaging report(s) reviewed  No orders to display         Assessment:  Adriana Floyd is a 84 y.o. female who presents today for follow up nephrology care.      - ibuprofen   Plan:  1.  Stage 3a chronic kidney disease (HCC)  -Creatinine and GFR fluctuate between stage IIIa and IIIb CKD, currently within stage IIIa CKD.  Underlying CKD likely from history of smoking, ibuprofen use, age-related kidney decline.  Her fluctuations in creatinine are likely due to recent ibuprofen use, I recommend avoiding NSAIDs.  I recommend a low-salt diet.  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  She is at low risk of CKD progression to ESRD.    KFRE 2-Year: 0.1% at 12/5/2024  3:20 PM  Calculated from:  Serum Creatinine: 1.08 mg/dL at 12/5/2024  3:20 PM  Urine Albumin Creatinine Ratio: 6 mg/g at 1/24/2024 11:50 AM  Age: 84 years  Sex: Female at 12/5/2024  3:20  PM  Has CKD-3 to CKD-5: Yes    KFRE 5-Year: 0.2% at 12/5/2024  3:20 PM  Calculated from:  Serum Creatinine: 1.08 mg/dL at 12/5/2024  3:20 PM  Urine Albumin Creatinine Ratio: 6 mg/g at 1/24/2024 11:50 AM  Age: 84 years  Sex: Female at 12/5/2024  3:20 PM  Has CKD-3 to CKD-5: Yes    2. Vitamin D Deficiency  - I will prescribe ergocalciferol 50,000 units by mouth weekly for 12 weeks.     As patient is at low risk of CKD progression, will discharge from nephrology clinic.  Refer back as needed.  Return to clinic as needed    James Zaman MD  Nephrology  [facility]

## 2025-01-21 DIAGNOSIS — G50.0 TIC DOULOUREUX: ICD-10-CM

## 2025-01-21 RX ORDER — GABAPENTIN 100 MG/1
100 CAPSULE ORAL 3 TIMES DAILY
Qty: 300 CAPSULE | Refills: 2 | Status: SHIPPED | OUTPATIENT
Start: 2025-01-21

## 2025-01-21 NOTE — TELEPHONE ENCOUNTER
Received request via: Pharmacy    Was the patient seen in the last year in this department? Yes    Does the patient have an active prescription (recently filled or refills available) for medication(s) requested? No    Pharmacy Name: [id number] - [place], [place] - [address] [facility] AT [facility] [address]     Does the patient have longterm Plus and need 100-day supply? (This applies to ALL medications) Patient does not have SCP

## 2025-03-03 ENCOUNTER — HOSPITAL ENCOUNTER (OUTPATIENT)
Dept: RADIOLOGY | Facility: MEDICAL CENTER | Age: 85
End: 2025-03-03
Attending: SURGERY
Payer: MEDICARE

## 2025-03-03 DIAGNOSIS — I71.43 INFRARENAL ABDOMINAL AORTIC ANEURYSM (AAA) WITHOUT RUPTURE (HCC): ICD-10-CM

## 2025-03-03 DIAGNOSIS — I72.3 ILIAC ARTERY ANEURYSM, BILATERAL (HCC): ICD-10-CM

## 2025-03-03 PROCEDURE — 93978 VASCULAR STUDY: CPT

## 2025-03-03 PROCEDURE — 93978 VASCULAR STUDY: CPT | Mod: 26 | Performed by: INTERNAL MEDICINE

## 2025-03-25 ENCOUNTER — OFFICE VISIT (OUTPATIENT)
Dept: DERMATOLOGY | Facility: IMAGING CENTER | Age: 85
End: 2025-03-25
Payer: MEDICARE

## 2025-03-25 DIAGNOSIS — D48.9 NEOPLASM OF UNCERTAIN BEHAVIOR: ICD-10-CM

## 2025-03-25 PROCEDURE — 99203 OFFICE O/P NEW LOW 30 MIN: CPT | Performed by: NURSE PRACTITIONER

## 2025-03-25 NOTE — PROGRESS NOTES
DERMATOLOGY NOTE  NEW VISIT       Chief complaint: New Patient (Lesion on right quadrant )     HPI/location: Right lower quadrant abdomen  Time present: 1.5 years   Painful lesion: Yes, when something rubs against it   Itching lesion: No  Enlarging lesion: No           History of skin cancer: No  History of precancers/actinic keratoses: No  History of biopsies:No  History of blistering/severe sunburns:Yes, Details: younger years  Family history of skin cancer:No  Family history of atypical moles:No      Allergies   Allergen Reactions    Oxycodone-Acetaminophen Vomiting        MEDICATIONS:  Medications relevant to specialty reviewed.     REVIEW OF SYSTEMS:   Positive for skin (see HPI)  Negative for fevers and chills       EXAM:  There were no vitals taken for this visit.  Constitutional: Well-developed, well-nourished, and in no distress.     A focused skin exam was performed including the affected areas of the abdomen. Notable findings on exam today listed below and/or in assessment/plan.     Localized ulceration to R lower abdomen    IMPRESSION / PLAN:    1. Neoplasm of uncertain behavior  Ulcerated wound to abdomen, traumatized by clothing  Pt has been using daily Neosporin  Discussed bx, pt agrees to stopping neosporin and keep thin layer of Vaseline  Advised to keep open air when able  Close follow up 2-3 weeks, if no improvement, will biopsy portion of lesion    Patient verbalized understanding and agrees with plan regarding the above        Please note that this dictation was created using voice recognition software. I have made every reasonable attempt to correct obvious errors, but I expect that there are errors of grammar and possibly content that I did not discover before finalizing the note.      Return to clinic in: Return in about 2 weeks (around 4/8/2025) for spot check, possible Bx. and as needed for any new or changing skin lesions.

## 2025-08-11 ENCOUNTER — HOSPITAL ENCOUNTER (OUTPATIENT)
Dept: RADIOLOGY | Facility: MEDICAL CENTER | Age: 85
End: 2025-08-11
Attending: INTERNAL MEDICINE
Payer: MEDICARE

## 2025-08-11 DIAGNOSIS — I65.23 BILATERAL CAROTID ARTERY STENOSIS: ICD-10-CM

## 2025-08-11 PROCEDURE — 93880 EXTRACRANIAL BILAT STUDY: CPT

## 2025-08-12 DIAGNOSIS — I71.40 ABDOMINAL AORTIC ANEURYSM (AAA) WITHOUT RUPTURE, UNSPECIFIED PART (HCC): Primary | ICD-10-CM

## 2025-08-12 DIAGNOSIS — I72.3 ILIAC ANEURYSM (HCC): ICD-10-CM

## 2025-08-12 PROBLEM — I65.23 BILATERAL CAROTID ARTERY STENOSIS: Chronic | Status: ACTIVE | Noted: 2023-09-27

## 2025-08-20 ENCOUNTER — APPOINTMENT (OUTPATIENT)
Dept: MEDICAL GROUP | Facility: PHYSICIAN GROUP | Age: 85
End: 2025-08-20
Payer: MEDICARE

## 2025-08-20 ENCOUNTER — APPOINTMENT (OUTPATIENT)
Dept: LAB | Facility: MEDICAL CENTER | Age: 85
End: 2025-08-20
Payer: MEDICARE

## 2025-08-28 ENCOUNTER — OFFICE VISIT (OUTPATIENT)
Dept: MEDICAL GROUP | Facility: PHYSICIAN GROUP | Age: 85
End: 2025-08-28
Payer: MEDICARE

## 2025-08-28 VITALS
OXYGEN SATURATION: 98 % | WEIGHT: 128.5 LBS | SYSTOLIC BLOOD PRESSURE: 114 MMHG | BODY MASS INDEX: 22.77 KG/M2 | TEMPERATURE: 98.3 F | HEART RATE: 64 BPM | HEIGHT: 63 IN | RESPIRATION RATE: 16 BRPM | DIASTOLIC BLOOD PRESSURE: 62 MMHG

## 2025-08-28 DIAGNOSIS — E55.9 VITAMIN D DEFICIENCY: Chronic | ICD-10-CM

## 2025-08-28 DIAGNOSIS — G44.321 INTRACTABLE CHRONIC POST-TRAUMATIC HEADACHE: ICD-10-CM

## 2025-08-28 DIAGNOSIS — K63.5 POLYP OF COLON, UNSPECIFIED PART OF COLON, UNSPECIFIED TYPE: ICD-10-CM

## 2025-08-28 DIAGNOSIS — N18.31 STAGE 3A CHRONIC KIDNEY DISEASE: ICD-10-CM

## 2025-08-28 DIAGNOSIS — E03.9 ACQUIRED HYPOTHYROIDISM: Chronic | ICD-10-CM

## 2025-08-28 DIAGNOSIS — I65.23 BILATERAL CAROTID ARTERY STENOSIS: Chronic | ICD-10-CM

## 2025-08-28 DIAGNOSIS — F17.210 CIGARETTE NICOTINE DEPENDENCE WITHOUT COMPLICATION: Chronic | ICD-10-CM

## 2025-08-28 DIAGNOSIS — I71.43 INFRARENAL ABDOMINAL AORTIC ANEURYSM (AAA) WITHOUT RUPTURE (HCC): Chronic | ICD-10-CM

## 2025-08-28 DIAGNOSIS — Z23 NEED FOR VACCINATION: ICD-10-CM

## 2025-08-28 DIAGNOSIS — F51.01 PRIMARY INSOMNIA: Primary | ICD-10-CM

## 2025-08-28 DIAGNOSIS — G50.0 TIC DOULOUREUX: ICD-10-CM

## 2025-08-28 DIAGNOSIS — E78.5 DYSLIPIDEMIA: Chronic | ICD-10-CM

## 2025-08-28 PROBLEM — R42 LIGHTHEADEDNESS: Status: RESOLVED | Noted: 2024-10-07 | Resolved: 2025-08-28

## 2025-08-28 PROBLEM — H61.23 BILATERAL IMPACTED CERUMEN: Status: RESOLVED | Noted: 2024-11-15 | Resolved: 2025-08-28

## 2025-08-28 RX ORDER — TRAZODONE HYDROCHLORIDE 50 MG/1
50 TABLET ORAL NIGHTLY
Qty: 30 TABLET | Refills: 3 | Status: SHIPPED | OUTPATIENT
Start: 2025-08-28

## 2025-08-28 ASSESSMENT — PATIENT HEALTH QUESTIONNAIRE - PHQ9: CLINICAL INTERPRETATION OF PHQ2 SCORE: 0
